# Patient Record
Sex: FEMALE | Race: WHITE | URBAN - METROPOLITAN AREA
[De-identification: names, ages, dates, MRNs, and addresses within clinical notes are randomized per-mention and may not be internally consistent; named-entity substitution may affect disease eponyms.]

---

## 2019-04-10 ENCOUNTER — OFFICE VISIT (OUTPATIENT)
Dept: INTERNAL MEDICINE CLINIC | Age: 49
End: 2019-04-10

## 2019-04-10 VITALS
RESPIRATION RATE: 18 BRPM | SYSTOLIC BLOOD PRESSURE: 99 MMHG | WEIGHT: 143 LBS | HEART RATE: 65 BPM | BODY MASS INDEX: 24.41 KG/M2 | TEMPERATURE: 98 F | OXYGEN SATURATION: 98 % | DIASTOLIC BLOOD PRESSURE: 67 MMHG | HEIGHT: 64 IN

## 2019-04-10 DIAGNOSIS — E88.1 LIPOATROPHY: ICD-10-CM

## 2019-04-10 DIAGNOSIS — Z00.00 ENCOUNTER FOR PREVENTIVE HEALTH EXAMINATION: ICD-10-CM

## 2019-04-10 DIAGNOSIS — J01.10 ACUTE NON-RECURRENT FRONTAL SINUSITIS: ICD-10-CM

## 2019-04-10 DIAGNOSIS — G43.009 MIGRAINE WITHOUT AURA AND WITHOUT STATUS MIGRAINOSUS, NOT INTRACTABLE: ICD-10-CM

## 2019-04-10 DIAGNOSIS — G35 MULTIPLE SCLEROSIS (HCC): Primary | ICD-10-CM

## 2019-04-10 PROBLEM — N20.0 CALCULUS OF KIDNEY: Status: ACTIVE | Noted: 2019-04-10

## 2019-04-10 RX ORDER — PERPHENAZINE 2 MG
1 TABLET ORAL DAILY
Qty: 90 CAP | Refills: 3 | Status: SHIPPED | OUTPATIENT
Start: 2019-04-10

## 2019-04-10 RX ORDER — ACETAMINOPHEN 500 MG
TABLET ORAL
Qty: 30 CAP | Refills: 11 | Status: SHIPPED | OUTPATIENT
Start: 2019-04-10

## 2019-04-10 RX ORDER — AZITHROMYCIN 250 MG/1
TABLET, FILM COATED ORAL
Qty: 6 TAB | Refills: 0 | Status: SHIPPED | OUTPATIENT
Start: 2019-04-10 | End: 2019-04-10 | Stop reason: SDUPTHER

## 2019-04-10 RX ORDER — AZITHROMYCIN 250 MG/1
TABLET, FILM COATED ORAL
Qty: 6 TAB | Refills: 0 | Status: SHIPPED | OUTPATIENT
Start: 2019-04-10 | End: 2019-04-15

## 2019-04-10 NOTE — PATIENT INSTRUCTIONS
Sinusitis>>Zpak, Saline Nasal Spray, Vitamin C 
 
MS>>stable, check Vitamin D, Ocrevus Screening Services: 
 
Colonoscopy:       Dr. Faheem Ragland:                    Up to day Immunizations:   Consider flu vaccine

## 2019-04-10 NOTE — PROGRESS NOTES
Chief Complaint Patient presents with  Complete Physical  
1. Have you been to the ER, urgent care clinic since your last visit? Hospitalized since your last visit? No 
 
2. Have you seen or consulted any other health care providers outside of the [address] [place] [address] since your last visit? Include any pap smears or colon screening. No  
Labs ordered by provider Blood drawn from left arm Pt tolerated well

## 2019-04-10 NOTE — PROGRESS NOTES
HPI:  
 
This guillermo lady presents to the office for her annual wellness visit and her records from [facility] have not been forwarded although was requested weeks back. I queried [address] [place] [address] for information which was populated into the EMR. She is receiving Ocrevus every 6 months as a 5 hour infusion for the treatment of her relapsing remitting MS under the direction of HARLEEN Sheffield. She is tolerating this well and her most recent MRI brain did not show any new lesions. She denies any problems with cramps, paresthesias, blurring of vision ,weakness or cognitive impairment. She reports recent development of cough sometime after finishing her Ocrevus infusion which has kept her awake at night. She had noticed blood stained nasal drainage but denies any fever, chills or sore throat. She also reports taking Unisom because of difficulty getting to sleep. Her  snores heavily and even with the Unisom, she finds herself awake at 3AM with her mind spinning. She would like to avoid any prescription medication. She has documented urolithiasis which has been quiescent. She denies any flank pain or hematuria. Past Medical History:  
Diagnosis Date  Calculus of kidney 4/10/2019  Migraine without aura and without status migrainosus, not intractable 4/10/2019  Multiple sclerosis ([facility] [place] [address] 4/10/2019 Past Surgical History:  
Procedure Laterality Date  HX BREAST REDUCTION    
 HX  SECTION Current Outpatient Medications Medication Sig  ocrelizumab (OCREVUS) 30 mg/mL soln injection 1 mL by IntraVENous route every 6 months. Indications: relapsing form of multiple sclerosis No current facility-administered medications for this visit. Allergies and Intolerances: Allergies Allergen Reactions  Copaxone [Glatiramer] Other (comments) Flushing, headache, palpitations  Minocycline Other (comments) Light headed Family History:  
Family History Problem Relation Age of Onset  Osteoporosis Mother  Glaucoma Mother  Heart Disease Father  Heart Disease Maternal Grandmother Social History: She  reports that she has never smoked. She has never used smokeless tobacco.  
Social History Substance and Sexual Activity Alcohol Use Yes Comment: social  
    
Immunization History:        Avoid flu Diet:                                   Regular Exercise:                           None Review of Systems Constitutional: Negative for chills and fever. HENT: Negative for hearing loss. Eyes: Negative for blurred vision. Respiratory: Positive for cough. Negative for shortness of breath and wheezing. Cardiovascular: Negative for chest pain, palpitations and leg swelling. Gastrointestinal: Negative for abdominal pain, blood in stool and heartburn. Genitourinary: Negative for dysuria, hematuria and urgency. Musculoskeletal: Negative for joint pain and myalgias. Neurological: Negative for dizziness and headaches. Endo/Heme/Allergies: Positive for environmental allergies. Psychiatric/Behavioral: Negative for depression. The patient is nervous/anxious and has insomnia. Physical:  
Visit Vitals BP 99/67 Pulse 65 Temp 98 °F (36.7 °C) (Oral) Resp 18 Ht 5' 4\" (1.626 m) Wt 143 lb (64.9 kg) SpO2 98% BMI 24.55 kg/m² Physical Exam  
Constitutional: She is well-developed, well-nourished, and in no distress. HENT:  
Right Ear: External ear normal.  
Left Ear: External ear normal.  
Mouth/Throat: Oropharynx is clear and moist. No oropharyngeal exudate. Maxillary sinus tenderness Eyes: Conjunctivae are normal. No scleral icterus. Neck: No JVD present. No thyromegaly present. Cardiovascular: Normal rate, regular rhythm, normal heart sounds and intact distal pulses. Exam reveals no gallop. No murmur heard. Pulmonary/Chest: Breath sounds normal. She has no wheezes. She has no rales. Abdominal: Soft. Bowel sounds are normal. She exhibits no distension. There is no tenderness. Musculoskeletal: She exhibits no edema or tenderness. Lymphadenopathy:  
  She has no cervical adenopathy. Neurological: She is alert. She has normal strength and intact cranial nerves. She displays no tremor. Gait normal.  
Skin: She is not diaphoretic. Vitals reviewed. Review of Data: 
Labs: 
No results found for any previous visit. Impression/Plan: 
 Patient Active Problem List  
Diagnosis  Code Screening/Wellness Fasting labs ordered, confirm date of last colonoscopy and DEXA, immunizations discussed (avoids) Z0.00  Multiple sclerosis ([facility] [address] relapsing, remitting type Management per NP , check Vitamin D levle G35  Migraine without aura and without status migrainosus, not intractable Determine if abortive agent prescribed G43.009  Calculus of kidney Push acidulated fluids N20.0 Darci Aleman MD

## 2019-04-11 LAB
25(OH)D3+25(OH)D2 SERPL-MCNC: 59.2 NG/ML (ref 30–100)
ALBUMIN SERPL-MCNC: 4.4 G/DL (ref 3.5–5.5)
ALBUMIN/GLOB SERPL: 1.6 {RATIO} (ref 1.2–2.2)
ALP SERPL-CCNC: 67 IU/L (ref 39–117)
ALT SERPL-CCNC: 10 IU/L (ref 0–32)
APPEARANCE UR: CLEAR
AST SERPL-CCNC: 13 IU/L (ref 0–40)
BACTERIA #/AREA URNS HPF: NORMAL /[HPF]
BILIRUB SERPL-MCNC: 0.5 MG/DL (ref 0–1.2)
BILIRUB UR QL STRIP: NEGATIVE
BUN SERPL-MCNC: 16 MG/DL (ref 6–24)
BUN/CREAT SERPL: 25 (ref 9–23)
CALCIUM SERPL-MCNC: 9.8 MG/DL (ref 8.7–10.2)
CASTS URNS QL MICRO: NORMAL /LPF
CHLORIDE SERPL-SCNC: 105 MMOL/L (ref 96–106)
CHOLEST SERPL-MCNC: 206 MG/DL (ref 100–199)
CO2 SERPL-SCNC: 26 MMOL/L (ref 20–29)
COLOR UR: YELLOW
CREAT SERPL-MCNC: 0.65 MG/DL (ref 0.57–1)
EPI CELLS #/AREA URNS HPF: NORMAL /HPF (ref 0–10)
ERYTHROCYTE [DISTWIDTH] IN BLOOD BY AUTOMATED COUNT: 13.6 % (ref 12.3–15.4)
GLOBULIN SER CALC-MCNC: 2.8 G/DL (ref 1.5–4.5)
GLUCOSE SERPL-MCNC: 73 MG/DL (ref 65–99)
GLUCOSE UR QL: NEGATIVE
HCT VFR BLD AUTO: 42.2 % (ref 34–46.6)
HDLC SERPL-MCNC: 83 MG/DL
HGB BLD-MCNC: 13.6 G/DL (ref 11.1–15.9)
HGB UR QL STRIP: NEGATIVE
KETONES UR QL STRIP: NEGATIVE
LDLC SERPL CALC-MCNC: 101 MG/DL (ref 0–99)
LEUKOCYTE ESTERASE UR QL STRIP: NEGATIVE
MCH RBC QN AUTO: 30 PG (ref 26.6–33)
MCHC RBC AUTO-ENTMCNC: 32.2 G/DL (ref 31.5–35.7)
MCV RBC AUTO: 93 FL (ref 79–97)
MICRO URNS: NORMAL
MICRO URNS: NORMAL
MUCOUS THREADS URNS QL MICRO: PRESENT
NITRITE UR QL STRIP: NEGATIVE
PH UR STRIP: 7.5 [PH] (ref 5–7.5)
PLATELET # BLD AUTO: 261 X10E3/UL (ref 150–379)
POTASSIUM SERPL-SCNC: 5 MMOL/L (ref 3.5–5.2)
PROT SERPL-MCNC: 7.2 G/DL (ref 6–8.5)
PROT UR QL STRIP: NEGATIVE
RBC # BLD AUTO: 4.54 X10E6/UL (ref 3.77–5.28)
RBC #/AREA URNS HPF: NORMAL /HPF (ref 0–2)
SODIUM SERPL-SCNC: 144 MMOL/L (ref 134–144)
SP GR UR: 1.01 (ref 1–1.03)
TRIGL SERPL-MCNC: 109 MG/DL (ref 0–149)
TSH SERPL DL<=0.005 MIU/L-ACNC: 3.91 UIU/ML (ref 0.45–4.5)
UROBILINOGEN UR STRIP-MCNC: 0.2 MG/DL (ref 0.2–1)
VLDLC SERPL CALC-MCNC: 22 MG/DL (ref 5–40)
WBC # BLD AUTO: 7.3 X10E3/UL (ref 3.4–10.8)
WBC #/AREA URNS HPF: NORMAL /HPF (ref 0–5)

## 2019-04-25 DIAGNOSIS — G44.229 CHRONIC TENSION-TYPE HEADACHE, NOT INTRACTABLE: Primary | ICD-10-CM

## 2019-05-24 ENCOUNTER — TELEPHONE (OUTPATIENT)
Dept: INTERNAL MEDICINE CLINIC | Age: 49
End: 2019-05-24

## 2019-05-24 NOTE — TELEPHONE ENCOUNTER
Pt called stating she took unisom instead of advil this morning. She is concerned and would like to know what to do. Please advise asap.

## 2019-09-20 PROBLEM — Z00.00 ENCOUNTER FOR PREVENTIVE HEALTH EXAMINATION: Status: RESOLVED | Noted: 2019-04-10 | Resolved: 2019-09-20

## 2019-09-25 ENCOUNTER — TELEPHONE (OUTPATIENT)
Dept: FAMILY MEDICINE CLINIC | Age: 49
End: 2019-09-25

## 2019-09-25 NOTE — TELEPHONE ENCOUNTER
PT calling to speak w/ Jaleesa Bio in regards to a medication refill for migraines & she is not sure of the name and thinks Jaleesa Bio would be of better assistance

## 2020-03-23 ENCOUNTER — OFFICE VISIT (OUTPATIENT)
Dept: NEUROLOGY | Age: 50
End: 2020-03-23

## 2020-03-23 VITALS
BODY MASS INDEX: 25.27 KG/M2 | OXYGEN SATURATION: 98 % | WEIGHT: 148 LBS | SYSTOLIC BLOOD PRESSURE: 122 MMHG | HEIGHT: 64 IN | DIASTOLIC BLOOD PRESSURE: 70 MMHG | HEART RATE: 69 BPM

## 2020-03-23 DIAGNOSIS — Z79.899 LONG-TERM CURRENT USE OF HIGH RISK MEDICATION OTHER THAN ANTICOAGULANT: ICD-10-CM

## 2020-03-23 DIAGNOSIS — F51.04 PSYCHOPHYSIOLOGICAL INSOMNIA: ICD-10-CM

## 2020-03-23 DIAGNOSIS — F41.9 ANXIETY: ICD-10-CM

## 2020-03-23 DIAGNOSIS — G35 MULTIPLE SCLEROSIS (HCC): Primary | ICD-10-CM

## 2020-03-23 DIAGNOSIS — Z79.899 HIGH RISK MEDICATION USE: ICD-10-CM

## 2020-03-23 RX ORDER — ESCITALOPRAM OXALATE 5 MG/1
5 TABLET ORAL DAILY
Qty: 30 TAB | Refills: 2 | Status: SHIPPED | OUTPATIENT
Start: 2020-03-23 | End: 2020-09-21

## 2020-03-23 NOTE — PROGRESS NOTES
Ck Lucero is a 47 y.o. female who presents today for the following:  Chief Complaint   Patient presents with    New Patient     just had ocrevus infusion doing well    Multiple Sclerosis         HPI  Historical Data    Patient is known to me from my prior practice and is followed for a diagnosis of MS  She has relapsing remitting MS    Ocrevus: Induction infusion  competed September 8 and 22, 2017  Infusion  #2 completed March 16th, 2018  Infusion #3 Sept 21 2018  Infusion #4 March 25, 2019  Infusion #5 September 20, 2019 [bioscript on [address]]  Infusion #6 March 25, 2020 (by a prescription on [address] [facility] in [place])    Denies [place]   On [facility] [place]: feels Penne Sermons fog\" gone; cognition more clear    Prior DMT  Copaxone 40mg 3x/week  Copaxone 20mg daily    7/10/17. JCV: negative. Hep B: non-reactive. Pt's blood work panel[CBC, CMP, VZV, JCV, Hep B] of 7/10/17 including Hep B was normal/non-reactive. Multiple sclerosis (MS) symptoms are stable. Since last visit, there has been nothing to suggest an MS relapse. .     Denies: B/B, mood, Cognition, fatigue, sleep issues, HAs and Migraines, Numbness and paresthesia, weakness,     11/2018: ER for final dx of Gastroenteritis, near syncope and dehydration  [EMR reviewed OV 4/30/19]   WBC: 15.0  Ketones 20 in Urine  Abd/pelvic CT: inflammatory/ infectious enteritis  D/C home wo further event  Per pt: was being treated for rash w Minocin and per pt PCP states due to a reaction to Abx      6/2017  Copaxone injection reaction [see below]  Stress and Echo: WNL. Pt was seen 6/21/17 due to Copaxone injection reaction. Monday night pt injected as usual.   Had a bit of blood from injection site that happens from time to time per pt report.    But it was a bit more than usual.   She remembers not feeling well and having a burning sensation in her chest and tachycardia [she had had it once before and Dx as IPIR] but this time she felt so poorly that she laid down and she got severe low back pain  abdominal cramping and her face felt hot turned red and began to swell. She never felt like her breathing was impaired or her throat was closing or could not breath. 911 was called. By the time she arrived she was feeling some better and did not go to the hospital.   She did call me, [as she has my personal phone number] and recounted the  above data. She was advised NOT to take the Copaxone again [and she has not]. She was advised to take Benadryl that night [which she did] and follow up during the day with OTC non-sedating histamine down regulator such as Claritin, Zyrtec etc.   She is taking Claritin daily. And she was advised if sx should worsen again or breathing feels compromised to go to the ER. And she was advised to see me in the office this week for evaluation and discussion of change in DMT [and she is here today to do so]. At f/u visit:  she feels worn out and that she was \"hit by a truck\". and still achy all over. She expresses concern about possible cardiac damage. Stress test was odered  pt with significant reaction to Copaxone. I STILL  think it is more of an allergic reaction than simply a IPIR. Pt had facial swelling. She has had a IPIR prior to this when on the 20mg daily injection: that one was similar but not as severe and no facial flushing or swelling and did not take a week to recover. And I would not expect an IPIR to escalate. She hit a superficial vein with this last one so it is possible that may have caused more rapid absorption to magnify event but there is no way to know and I lean toward it not being a major player. There is NO WAY to monitor for superficial vein distribution so in-office monitoring would not be beneficial.         2011: + Lyme titer; worked up by Dr. Gila Todd and felt to be a false + reading. 4/23/18  MRI Head w/wo contrast  Comparision: 5/19/17  Unchanged      5/19/17. MRI Head.    NO new lesions identified. No acute changes. NO enhancing lesions. Repeat MRI Brain and C-spine  1/2016:. No new lesions, no enhancing lesions, no interval change :).       8/26/15. MRI BRain w/wo. no acute changes. no new lesions. 7/8/15. MRI Brain w and wo: .   2 new small foci identified. small pineal cyst: unchanged. no acute process. MRI C-spine w/wo.   multiple disc bulges. C5-6: mild LT and minimal Rt foramina stenosis. multiple meningeal cyst or diverticulum. Subtle T2 intensity RT lateral cervical C2 suspect consistent w mild demyelinating lesion. no acute process. MRI T-spine w/wo. no evidence of demyelinating process: acute or chronic.   scattered multilevel minimal or small root diverticula or perineural root cysts bilaterally. Medical Hx:  uterine ablation    Known allergies: codeine: nausea. heat rash on chest spring 2019; also had it in November 2018 and went on an Abx and had a reaction and went to ER; Heart rate dropped and presyncope: reaction to minocycline       Other current medical issues historically: Migraines    Interim Data:   Regarding MS:  Patient is doing well has no complaints and denies any focal signs or symptoms related to MS dysfunction. Specifically she denies any focal weakness or paresthesias, acute visual changes, hearing loss tinnitus, speech or swallowing problems, focal weakness paresthesias, bowel or bladder dysfunction, cognitive difficulties, chronic fatigue. 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 She tries to keep his \"strong personality\" at the office and to maintain a calm demeanor is people look to her for leadership since she is owner of the [employer].     Additionally they are having in addition put on their house currently, her college son is home for the rest of the semester as most college students are, her daughter is home she is in high school, and her son who is graduated from college has recently been laid off and will be moving home at the end of the month. So additionally she has stressors related to family issues as well. Surprisingly, she is having no problems with headaches or migraines      ROS  Other than what is stated above under HPI there is no new or changing issues related to the following:  Constitutional: No recent weight change, fever,fatigue, sleep difficulties, or loss of appetite. ENT/Mouth:  No hearing loss, ringing in the ears, chronic sinus problem, nose bleeds sore throat, voice change, hoarseness, swollen glands in neck, or difficulties with chewing and swallowing. Cardiovascular:  No chest pain/angina pectoris, palpitations,swelling of feet/ankles/hands, or calf pain while walking. Respiratory: No chronic or frequent coughs, spitting up blood, shortness of breath, asthma, or wheezing. Gastrointestinal: No abdominal pain, heartburn, nausea, vomiting, constipation, frequent diarrhea, rectal bleeding, or blood in stool. Genitourinary: No frequent urination, burning or painful urination, blood in urine, incontinence or dribbling. Musculoskeletal:   No joint pain, stiffness/swelling, weakness of muscles, muscle pain/cramp, or back pain. Integument:   No rash/itching, change in skin color, change in hair/nails, or change in color/size of moles. Neurological:  No dizziness/vertigo, loss of consciousness, numbness/tingling sensation, tremors, weakness in limbs, difficulty with balance, frequent or recurring headaches, memory loss or confusion. Psychiatric:   No nervousness, depression, hallucinations, paranoia or suspiciousness. Endocrine: No excessive thirst or urination, heat or cold intolerance.    Hematologic/Lymphatic: No bleeding/bruising tendency, phlebitis, or past transfusion. EXAMINATION  Visit Vitals  /70   Pulse 69   Ht 5' 4\" (1.626 m)   Wt 148 lb (67.1 kg)   SpO2 98%   BMI 25.40 kg/m²          General appearance: Patient is well-developed and well-nourished in no apparent distress and well groomed. Psych/mental health:  Affect: Appropriate appropriately tearful at times when discussing her stress levels and the factors affecting that. PHQ  3 most recent PHQ Screens 3/23/2020   Little interest or pleasure in doing things Not at all   Feeling down, depressed, irritable, or hopeless Not at all   Total Score PHQ 2 0       HEENT: Normocephalic, without evidence of trauma  Full range of motion, no tenderness to palpation in the head or neck region     Cardiovascular: Extremities warm to touch, no swelling appreciated    Respiratory: No dyspnea on exertion noted, normal effort on casual observation    Musculoskeletal: No evidence of significant bony deformities or spinal curvature    Integumentary:  No obvious bruising, lacerations or discoloaration on casual observation. Neurological Examination:   Mental Status:        MMSE  No flowsheet data found. Formal testing was not completed    there was nothing concerning on general observation and discussion.    Alert oriented and appropriate to general conversation  Normal processing on general observation  Followed conversation and responded seemingly appropriate throughout the office visit  No word finding difficulties noted on casual observation  Able to follow directions without difficulty     Cranial Nerves:    PERRLA,   EOMs intact gaze is conjugate  No nystagmus is appreciated  No AMY  Facial motor and sensory intact bilaterally  Hearing intact to conversation  Voice with normal projection, no evidence of secretion pooling  Palate elevates symmetrically  Shoulder shrug intact bilaterally  No tongue deviation appreciated     Motor:   Normal tone and bulk  Fine dexterity intact bilaterally  No tremor appreciated on today's exam  No abnormal movements appreciated on today's exam    Muscle strength testing:  Right side  Upper extremities  Deltoid 5/5  Bicep 5/5  Tricep 5/5  Hand grasp 5/5    Lower extremity  Hip flexor 5/5  Extensor 5/5  Flexor 5/5  Plantar flexion 5/5  Dorsiflexion 5/5      Left side  Deltoid 5/5  Bicep 5/5  Tricep 5/5  Hand grasp 5/5    Lower extremity  Hip flexor 5/5  Extensor 5/5  Flexor 5/5  Plantar flexion 5/5  Dorsiflexion 5/5    Sensation: Intact to light touch bilaterally    Coordination/Cerebellar:   Romberg negative    Gait: Ambulates independently with normal gait and station    Fall risk assessment  No flowsheet data found. Reflexes: Not tested    ASSESSMENT AND PLAN  Relapsing remitting MS currently on Ocrevus therapy infusion #6 completed March 25, 2020. No complications were reported related to this infusion by the patient or the facility. Patient denies any side effects or problems that she is aware of. She does not report any signs or symptoms related to MS flareup or progression of disease process. She recently had blood work done by her primary care office on March 23, 2020 that was faxed to our practice and was reviewed at today's office visit. Vitamin D is at an excellent level for MS at 62.5 my goal is to keep them above 40. Thyroid chemistry and hematology panel were all unremarkable however differential was not performed urinalysis was unremarkable    My only recommendation would be a next blood draw that we get a differential with a CBC. Presently no neuro imaging will be ordered given the issue of the pandemic. Normally she would be due spring 2019 for her annual screening MRIs but this will have to be deferred presently. I will defer JCV blood draw at this time for the same reason I think the risk of any additional possible exposure is greater than the risk of not getting the JCV titer in this particular patient.     We reviewed [address] and and [place]MSS guidelines regarding MS patients and COVID 19 related to the disease process and drug modifying therapy. For This patient no adjustments are necessary and she is to follow CDC guidelines regarding social distance and self quarantine as appropriate. And she can always contact me if she has any questions or concerns    Stress and anxiety: Appropriate given the current state of affairs and her concerns related to running a business. We talked about behavioral stress management techniques and we are going to add Lexapro 5 mg daily. We might need to increase that to 10 mg. Insomnia: Related to stress and anxiety interventions as stated above. We also talked about ways to improve sleep hygiene especially in these times of stress and anxiety. Finally we have talked about ways to help keep her employees safe which is a heavy burden on her. Effectively we discussed CDC guidelines and ways to better implement this in her work environment which she states she is already doing with the guidance of her HR department. But it is still concerning to her. I have directed her to perhaps have her HR person reach out to [employer] further guidance as well. I will see her back in 6 months. If she needs to be seen before then she is to contact the office via my chart or phone call and we can set up a telehealth visit. ICD-10-CM ICD-9-CM    1. Multiple sclerosis ([address] G35 340    2. Anxiety F41.9 300.00 escitalopram oxalate (LEXAPRO) 5 mg tablet   3. High risk medication use Z79.899 V58.69    4. Long-term current use of high risk medication other than anticoagulant Z79.899 V58.69    5. Psychophysiological insomnia F51.04 307.42            Additional pertinent medical data reviewed at today's office visit:    Flowsheets    Allergies   Allergen Reactions    Flaxseed Anaphylaxis    Glatiramer (Copolymer 1) Swelling     Rapid heartbeat.  Burning in chest.    Codeine Nausea and Vomiting     NAUSEA    Copaxone [Glatiramer] Other (comments)     Flushing, headache, palpitations    Minocycline Other (comments)     Light headed        Current Outpatient Medications   Medication Sig    escitalopram oxalate (LEXAPRO) 5 mg tablet Take 1 Tab by mouth daily.  ocrelizumab (OCREVUS) 30 mg/mL soln injection 1 mL by IntraVENous route every 6 months. Indications: relapsing form of multiple sclerosis    cholecalciferol (VITAMIN D3) 2,000 unit cap capsule 5000 units by mouth once a day  Indications: Prevention of Vitamin D Deficiency    krill-om-3-dha-epa-phospho-ast (KRILL OIL) 1,409-043-46-80 mg cap Take 1 Cap by mouth daily. No current facility-administered medications for this visit. Past Medical History:   Diagnosis Date    Calculus of kidney 4420    Folliculitis     Lipoatrophy     Copaxone    Migraine without aura and without status migrainosus, not intractable 4/10/2019    Multiple sclerosis ([facility] [address] 4/10/2019    Optic neuritis     Subclinical hypothyroidism        Past Surgical History:   Procedure Laterality Date    HX BREAST REDUCTION      HX  SECTION      HX HYSTEROSCOPY WITH ENDOMETRIAL ABLATION      HX LITHOTRIPSY         Social History     Socioeconomic History    Marital status:      Spouse name: Not on file    Number of children: Not on file    Years of education: Not on file    Highest education level: Not on file   Tobacco Use    Smoking status: Never Smoker    Smokeless tobacco: Never Used   Substance and Sexual Activity    Alcohol use: Yes     Comment: social    Drug use: Never    Sexual activity: Yes     Partners: Male       Family History   Problem Relation Age of Onset    Osteoporosis Mother     Glaucoma Mother     Heart Disease Father     Heart Disease Maternal Grandmother           No visits with results within 3 Month(s) from this visit.    Latest known visit with results is:   Office Visit on 04/10/2019   Component Date Value    Glucose 04/10/2019 73     BUN 04/10/2019 16     Creatinine 04/10/2019 0.65     GFR est non-AA 04/10/2019 105     GFR est AA 04/10/2019 121     BUN/Creatinine ratio 04/10/2019 25*    Sodium 04/10/2019 144     Potassium 04/10/2019 5.0     Chloride 04/10/2019 105     CO2 04/10/2019 26     Calcium 04/10/2019 9.8     Protein, total 04/10/2019 7.2     Albumin 04/10/2019 4.4     GLOBULIN, TOTAL 04/10/2019 2.8     A-G Ratio 04/10/2019 1.6     Bilirubin, total 04/10/2019 0.5     Alk. phosphatase 04/10/2019 67     AST (SGOT) 04/10/2019 13     ALT (SGPT) 04/10/2019 10     Specific Gravity 04/10/2019 1.014     pH (UA) 04/10/2019 7.5     Color 04/10/2019 Yellow     Appearance 04/10/2019 Clear     Leukocyte Esterase 04/10/2019 Negative     Protein 04/10/2019 Negative     Glucose 04/10/2019 Negative     Ketone 04/10/2019 Negative     Blood 04/10/2019 Negative     Bilirubin 04/10/2019 Negative     Urobilinogen 04/10/2019 0.2     Nitrites 04/10/2019 Negative     Microscopic Examination 04/10/2019 Comment     Microscopic exam 04/10/2019 See additional order     WBC 04/10/2019 0-5     RBC 04/10/2019 0-2     Epithelial cells 04/10/2019 0-10     Casts 04/10/2019 None seen     Mucus 04/10/2019 Present     Bacteria 04/10/2019 Few     WBC 04/10/2019 7.3     RBC 04/10/2019 4.54     HGB 04/10/2019 13.6     HCT 04/10/2019 42.2     MCV 04/10/2019 93     MCH 04/10/2019 30.0     MCHC 04/10/2019 32.2     RDW 04/10/2019 13.6     PLATELET 83/00/9967 638     Cholesterol, total 04/10/2019 206*    Triglyceride 04/10/2019 109     HDL Cholesterol 04/10/2019 83     VLDL, calculated 04/10/2019 22     LDL, calculated 04/10/2019 101*    VITAMIN D, 25-HYDROXY 04/10/2019 59.2     TSH 04/10/2019 3.910        XR Results (maximum last 3): No results found for this or any previous visit. CT Results (maximum last 3): No results found for this or any previous visit. MRI Results (maximum last 3):   No results found for this or any previous visit. Follow-up and Dispositions    · Return in about 6 months (around 9/23/2020).            Hilda Hamilton, MS, ANP-BC, [facility]

## 2020-03-23 NOTE — PATIENT INSTRUCTIONS
Office Policies · Phone calls/patient messages: Please allow up to 24 hours for someone in the office to contact you about your call or message. Be mindful your provider may be out of the office or your message may require further review. We encourage you to use [employer] for your messages as this is a faster, more efficient way to communicate with our office · Medication Refills: 
Prescription medications require up to 48 business hours to process. We encourage you to use [employer] for your refills. For controlled medications: Please allow up to 72 business hours to process. Certain medications may require you to  a written prescription at our office. NO narcotic/controlled medications will be prescribed after 4pm Monday through Friday or on weekends · Form/Paperwork Completion: 
Please note there is a $25 fee for all paperwork completed by our providers. We ask that you allow 7-14 business days. Pre-payment is due prior to picking up/faxing the completed form. You may also download your forms to [employer] to have your doctor print off. From an MS perspective you are doing great I will continue your Ocrevus infusions as scheduled at this point in time. We will see you back after you next Ocrevus infusion in 6 months. Per the recommendations of [address] [place] [address] Drive you are at no greater risk at this point in time to contract coronavirus so continued just based on CDC recommendations regarding safety management and distancing\" self quarantine You have got plenty of issues related to stress we talked about stress management technique especially your wind down at night please continue to try to institute those. On that is just start you out on low-dose Lexapro to help take the edge off a little bit to help you to relax a little bit more.   It is suboptimal dosing for adult dosing at just 5 mg daily give it about 6 weeks to kick in if you do not feel like this is helpful to you it may be due to the fact that the dosing is too low and we should probably increase it to 10 mg. 
 
I do not expect you to be on this medication lifelong but we may need to be on it for at least 3 to 6 months to get you through the current crisis. We are going to be available via teleconference until a visiting hopefully by next week at the latest.  So if you need to see me before 6 months we can set that up just contact the office for an appointment. Continue to follow the CDC recommendations and guidelines. This may be changing on a daily or hourly basis. This can be found on the web at:  [employer] Basic standard of care includes the following: 
 
Wash hands frequently using soap and water or hand  with at least 60% isopropyl alcohol content Social distancing of at least 6 feet If you think you have been exposed please self quarantine for 14 days as symptoms will begin anywhere between 2 to 14 days post exposure It is possible you could have been exposed to someone who is asymptomatic or not showing signs and symptoms at any point in time. Subsequently if you start developing any type of respiratory symptoms please self quarantine for up to 14 days. If you are showing signs or symptoms of any type of cough, cold, flu or even allergies please self quarantine for up to 14 days Only engage in essential functions only. This would include going out for food and living essentials. Please do not magda. The supply chain has not been broken or cut off you will be able to go out again and get more products as you use them. Avoid social gatherings of any size unless deemed essential.  This includes unfortunately, going to [employer] or visiting with family or friends. It is reasonable to go outside and walk your dog. But is recommended to stay close to your residence. It is still considered reasonable to go outside to exercise.   But again solo if possible especially if running or cycling. It is still considered reasonable to go outside and play with your children but again close to your personal residence and without social gathering. Do not linger in restaurants or bars. As of now drive-through and  are the recommendations. It is best to \"sit and stay\" meaning stay where you are and do not go out unless essential and do not travel unless essential 
 
Recommendations will continue to change over time so please stay up-to-date with the latest CDC recommendations site is listed above

## 2020-04-29 ENCOUNTER — TELEPHONE (OUTPATIENT)
Dept: NEUROLOGY | Age: 50
End: 2020-04-29

## 2020-04-29 NOTE — TELEPHONE ENCOUNTER
----- Message from Oneyda Campbell NP sent at 4/29/2020 10:07 AM EDT -----  Regarding: ocrevus  We need to contact [employer] their phone number is [phone] and get the above-stated patient\"s care transferred to me up here at [facility] as the patient followed me from my prior practice and they still all have all that data on file they do not have me at the new practice on file find out what we need to do to get her transferred over to this practice please and thank you

## 2020-04-29 NOTE — TELEPHONE ENCOUNTER
Spoke with Jenise of [facility], ID of the patient verified times 2. Jenise made aware the patient has followed Nydia Gresham NP to the neurology clinic in [address] New start form must be filled out with the new address and the patient will need to sign the new form, but Jenise stated [facility] and they can email the patient for consent.

## 2020-04-30 ENCOUNTER — TELEPHONE (OUTPATIENT)
Dept: NEUROLOGY | Age: 50
End: 2020-04-30

## 2020-04-30 NOTE — TELEPHONE ENCOUNTER
Spoke with Jimbo Jenkins at [facility] of the patient verified times 2. Nayely Vera NP information was faxed today to [address] [place] [address] at [phone] and the start form was faxed with [facility] information as well as the patient. They will have the patient signed the consent.

## 2020-09-21 ENCOUNTER — OFFICE VISIT (OUTPATIENT)
Dept: NEUROLOGY | Age: 50
End: 2020-09-21
Payer: COMMERCIAL

## 2020-09-21 VITALS
HEIGHT: 64 IN | BODY MASS INDEX: 25.61 KG/M2 | DIASTOLIC BLOOD PRESSURE: 75 MMHG | TEMPERATURE: 97.7 F | OXYGEN SATURATION: 98 % | HEART RATE: 67 BPM | WEIGHT: 150 LBS | SYSTOLIC BLOOD PRESSURE: 118 MMHG

## 2020-09-21 DIAGNOSIS — Z79.899 HIGH RISK MEDICATION USE: ICD-10-CM

## 2020-09-21 DIAGNOSIS — Z79.899 LONG-TERM CURRENT USE OF HIGH RISK MEDICATION OTHER THAN ANTICOAGULANT: ICD-10-CM

## 2020-09-21 DIAGNOSIS — F41.9 ANXIETY: ICD-10-CM

## 2020-09-21 DIAGNOSIS — G43.009 MIGRAINE WITHOUT AURA AND WITHOUT STATUS MIGRAINOSUS, NOT INTRACTABLE: ICD-10-CM

## 2020-09-21 DIAGNOSIS — E55.9 VITAMIN D DEFICIENCY: ICD-10-CM

## 2020-09-21 DIAGNOSIS — G35 MULTIPLE SCLEROSIS (HCC): Primary | ICD-10-CM

## 2020-09-21 DIAGNOSIS — F51.04 PSYCHOPHYSIOLOGICAL INSOMNIA: ICD-10-CM

## 2020-09-21 PROCEDURE — 99215 OFFICE O/P EST HI 40 MIN: CPT | Performed by: PSYCHIATRY & NEUROLOGY

## 2020-09-21 RX ORDER — EPINEPHRINE 0.3 MG/.3ML
0.3 INJECTION SUBCUTANEOUS AS NEEDED
COMMUNITY
Start: 2020-05-04

## 2020-09-21 RX ORDER — BUTALBITAL, ASPIRIN, AND CAFFEINE 325; 50; 40 MG/1; MG/1; MG/1
1 CAPSULE ORAL
COMMUNITY

## 2020-09-21 NOTE — PROGRESS NOTES
Moriah Hicks is a 45 y.o. female who presents today for the following:  Chief Complaint   Patient presents with    Follow-up     did not start anti anxiety prescibed    Multiple Sclerosis         HPI  Historical Data    Patient is known to me from my prior practice and is followed for a diagnosis of MS  She has relapsing remitting MS    Ocrevus: Induction infusion  competed September 8 and 22, 2017  Infusion  #2 completed March 16th, 2018  Infusion #3 Sept 21 2018  Infusion #4 March 25, 2019  Infusion #5 September 20, 2019 [bioscript on [address]]  Infusion #6 March 25, 2020 (by a prescription on [address] [facility]  [facility] [place]  Infusion #8 Pending for March 19 2021    Denies SE   On Pat Chávez: feels Sherral Brittle fog\" gone; cognition more clear    Prior DMT  Copaxone 40mg 3x/week  Copaxone 20mg daily    7/10/17. JCV: negative. Hep B: non-reactive. Pt's blood work panel[CBC, CMP, VZV, JCV, Hep B] of 7/10/17 including Hep B was normal/non-reactive. Multiple sclerosis (MS) symptoms are stable. Since last visit, there has been nothing to suggest an MS relapse. No evidence of infeciton    Denies: B/B, mood, Cognition, fatigue, sleep issues, HAs and Migraines, Numbness and paresthesia, weakness,     11/2018: ER for final dx of Gastroenteritis, near syncope and dehydration  [EMR reviewed OV 4/30/19]   WBC: 15.0  Ketones 20 in Urine  Abd/pelvic CT: inflammatory/ infectious enteritis  D/C home wo further event  Per pt: was being treated for rash w Minocin and per pt PCP states due to a reaction to Abx      6/2017  Copaxone injection reaction [see below]  Stress and Echo: WNL. Pt was seen 6/21/17 due to Copaxone injection reaction. Monday night pt injected as usual.   Had a bit of blood from injection site that happens from time to time per pt report.    But it was a bit more than usual.   She remembers not feeling well and having a burning sensation in her chest and tachycardia [she had had it once before and Dx as IPIR] but this time she felt so poorly that she laid down and she got severe low back pain  abdominal cramping and her face felt hot turned red and began to swell. She never felt like her breathing was impaired or her throat was closing or could not breath. 911 was called. By the time she arrived she was feeling some better and did not go to the hospital.   She did call me, [as she has my personal phone number] and recounted the  above data. She was advised NOT to take the Copaxone again [and she has not]. She was advised to take Benadryl that night [which she did] and follow up during the day with OTC non-sedating histamine down regulator such as Claritin, Zyrtec etc.   She is taking Claritin daily. And she was advised if sx should worsen again or breathing feels compromised to go to the ER. And she was advised to see me in the office this week for evaluation and discussion of change in DMT [and she is here today to do so]. At f/u visit:  she feels worn out and that she was \"hit by a truck\". and still achy all over. She expresses concern about possible cardiac damage. Stress test was odered  pt with significant reaction to Copaxone. I STILL  think it is more of an allergic reaction than simply a IPIR. Pt had facial swelling. She has had a IPIR prior to this when on the 20mg daily injection: that one was similar but not as severe and no facial flushing or swelling and did not take a week to recover. And I would not expect an IPIR to escalate. She hit a superficial vein with this last one so it is possible that may have caused more rapid absorption to magnify event but there is no way to know and I lean toward it not being a major player. There is NO WAY to monitor for superficial vein distribution so in-office monitoring would not be beneficial.         2011: + Lyme titer; worked up by Dr. Trenton Hanley and felt to be a false + reading.     4/23/18  MRI Head w/wo contrast  Comparision: 5/19/17  Unchanged      5/19/17. MRI Head. NO new lesions identified. No acute changes. NO enhancing lesions. Repeat MRI Brain and C-spine  1/2016:. No new lesions, no enhancing lesions, no interval change :).       8/26/15. MRI BRain w/wo. no acute changes. no new lesions. 7/8/15. MRI Brain w and wo: .   2 new small foci identified. small pineal cyst: unchanged. no acute process. MRI C-spine w/wo.   multiple disc bulges. C5-6: mild LT and minimal Rt foramina stenosis. multiple meningeal cyst or diverticulum. Subtle T2 intensity RT lateral cervical C2 suspect consistent w mild demyelinating lesion. no acute process. MRI T-spine w/wo. no evidence of demyelinating process: acute or chronic.   scattered multilevel minimal or small root diverticula or perineural root cysts bilaterally. Medical Hx:  uterine ablation    Known allergies: codeine: nausea. heat rash on chest spring 2019; also had it in November 2018 and went on an Abx and had a reaction and went to ER; Heart rate dropped and presyncope: reaction to minocycline       Other current medical issues historically: Migraines    Interim Data:   Regarding MS:  Patient is doing well has no complaints and denies any focal signs or symptoms related to MS dysfunction. Specifically she denies any focal weakness or paresthesias, acute visual changes, hearing loss tinnitus, speech or swallowing problems, focal weakness paresthesias, bowel or bladder dysfunction, cognitive difficulties, chronic fatigue.     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 but always present  She is now sleeping better at night and she is not as an emotional  She never started the Lexapro and seems to be doing okay    Headaches and migraines  Infrequent usually uses Fiorinal less than once a month to abort a headache    Additional pertinent data  May 4, 2021 to [facility] ED for anaphylactic reaction to flaxseed   Treated with Benadryl EpiPen and steroids   Was not admitted to the hospital and no further sequela   Discharged home on steroids and EpiPen    ROS  Other than what is stated above under HPI there is no new or changing issues related to the following:  Constitutional: No recent weight change, fever,fatigue, sleep difficulties, or loss of appetite. ENT/Mouth:  No hearing loss, ringing in the ears, chronic sinus problem, nose bleeds sore throat, voice change, hoarseness, swollen glands in neck, or difficulties with chewing and swallowing. Cardiovascular:  No chest pain/angina pectoris, palpitations,swelling of feet/ankles/hands, or calf pain while walking. Respiratory: No chronic or frequent coughs, spitting up blood, shortness of breath, asthma, or wheezing. Gastrointestinal: No abdominal pain, heartburn, nausea, vomiting, constipation, frequent diarrhea, rectal bleeding, or blood in stool. Genitourinary: No frequent urination, burning or painful urination, blood in urine, incontinence or dribbling. Musculoskeletal:   No joint pain, stiffness/swelling, weakness of muscles, muscle pain/cramp, or back pain. Integument:   No rash/itching, change in skin color, change in hair/nails, or change in color/size of moles. Neurological:  No dizziness/vertigo, loss of consciousness, numbness/tingling sensation, tremors, weakness in limbs, difficulty with balance, frequent or recurring headaches, memory loss or confusion. Psychiatric:   No nervousness, depression, hallucinations, paranoia or suspiciousness. Endocrine: No excessive thirst or urination, heat or cold intolerance. Hematologic/Lymphatic: No bleeding/bruising tendency, phlebitis, or past transfusion.        EXAMINATION  Visit Vitals  /75   Pulse 67   Temp 97.7 °F (36.5 °C)   Ht 5' 4\" (1.626 m)   Wt 150 lb (68 kg)   SpO2 98%   BMI 25.75 kg/m²          General appearance: Patient is well-developed and well-nourished in no apparent distress and well groomed. Psych/mental health:  Affect: Appropriate appropriately tearful at times when discussing her stress levels and the factors affecting that. PHQ  3 most recent PHQ Screens 9/21/2020   Little interest or pleasure in doing things Not at all   Feeling down, depressed, irritable, or hopeless Not at all   Total Score PHQ 2 0       HEENT: Normocephalic, without evidence of trauma  Full range of motion, no tenderness to palpation in the head or neck region     Cardiovascular: Extremities warm to touch, no swelling appreciated    Respiratory: No dyspnea on exertion noted, normal effort on casual observation    Musculoskeletal: No evidence of significant bony deformities or spinal curvature    Integumentary:  No obvious bruising, lacerations or discoloaration on casual observation. Neurological Examination:   Mental Status:        MMSE  No flowsheet data found. Formal testing was not completed    there was nothing concerning on general observation and discussion.    Alert oriented and appropriate to general conversation  Normal processing on general observation  Followed conversation and responded seemingly appropriate throughout the office visit  No word finding difficulties noted on casual observation  Able to follow directions without difficulty     Cranial Nerves:    PERRLA,   EOMs intact gaze is conjugate  No nystagmus is appreciated  No AMY  Facial motor and sensory intact bilaterally  Hearing intact to conversation  Voice with normal projection, no evidence of secretion pooling  Palate elevates symmetrically  Shoulder shrug intact bilaterally  No tongue deviation appreciated     Motor:   Normal tone and bulk  Fine dexterity intact bilaterally  No tremor appreciated on today's exam  No abnormal movements appreciated on today's exam    Muscle strength testing:  Right side  Upper extremities  Deltoid 5/5  Bicep 5/5  Tricep 5/5  Hand grasp 5/5    Lower extremity  Hip flexor 5/5  Extensor 5/5  Flexor 5/5  Plantar flexion 5/5  Dorsiflexion 5/5      Left side  Deltoid 5/5  Bicep 5/5  Tricep 5/5  Hand grasp 5/5    Lower extremity  Hip flexor 5/5  Extensor 5/5  Flexor 5/5  Plantar flexion 5/5  Dorsiflexion 5/5    Sensation: Intact to light touch bilaterally    Coordination/Cerebellar:   Romberg negative    Gait: Ambulates independently with normal gait and station    Fall risk assessment  No flowsheet data found. Reflexes: Not tested    ASSESSMENT AND PLAN  Relapsing remitting MS currently on Ocrevus therapy infusion #7 completed September , 2020. No complications were reported related to this infusion by the patient or the facility. Patient denies any side effects or problems that she is aware of. She does not report any signs or symptoms related to MS flareup or progression of disease process        Presently no neuro imaging will be ordered given the issue of the pandemic. Normally she would be due spring 2019 for her annual screening MRIs but this will have to be deferred presently. I will defer JCV blood draw at this time for the same reason I think the risk of any additional possible exposure is greater than the risk of not getting the JCV titer in this particular patient. Because the patient is doing well we have continued to defer her routine neuroimaging and blood work to include JVD CV panel at this time    Stress and anxiety:   Improved and seems to be managing it behaviorally    Insomnia: Related to stress and anxiety interventions as stated above. We again reviewed  ways to improve sleep hygiene especially in these times of stress and anxiety.     She has been reminded to continue hypervigilance related COVID 19 due to her present disease modifying therapy    Vitamin D deficiency: Remains on vitamin D therapy levels are greater than 40 which is excellent I want her to remain on vitamin D supplements presently to keep this from drifting downward    I will see her back in 6 months. If she needs to be seen before then she is to contact the office via my chart or phone call and we can set up a telehealth visit. ICD-10-CM ICD-9-CM    1. Multiple sclerosis ([facility] [address]  G35 340    2. High risk medication use  Z79.899 V58.69    3. Long-term current use of high risk medication other than anticoagulant  Z79.899 V58.69    4. Psychophysiological insomnia  F51.04 307.42    5. Anxiety  F41.9 300.00    6. Migraine without aura and without status migrainosus, not intractable  G43.009 346.10    7. Vitamin D deficiency  E55.9 268.9            Additional pertinent medical data reviewed at today's office visit:    Flowsheets    Allergies   Allergen Reactions    Flaxseed Anaphylaxis    Glatiramer (Copolymer 1) Swelling     Rapid heartbeat. Burning in chest.    Codeine Nausea and Vomiting     NAUSEA    Copaxone [Glatiramer] Other (comments)     Flushing, headache, palpitations    Minocycline Other (comments)     Light headed        Current Outpatient Medications   Medication Sig    EPINEPHrine (EPIPEN) 0.3 mg/0.3 mL injection 0.3 mg by IntraMUSCular route as needed.  butalbital-aspirin-caffeine (FIORINAL) capsule Take 1 Tab by mouth every four (4) hours as needed.  ocrelizumab (OCREVUS) 30 mg/mL soln injection 1 mL by IntraVENous route every 6 months. Indications: relapsing form of multiple sclerosis    cholecalciferol (VITAMIN D3) 2,000 unit cap capsule 5000 units by mouth once a day  Indications: Prevention of Vitamin D Deficiency    krill-om-3-dha-epa-phospho-ast (KRILL OIL) 1,580-357-66-80 mg cap Take 1 Cap by mouth daily.  multivit-mins no.63/iron/folic (M-VIT PO) Take  by mouth daily. No current facility-administered medications for this visit.         Past Medical History:   Diagnosis Date    Calculus of kidney 2/68/7120    Folliculitis     Lipoatrophy     Copaxone    Migraine without aura and without status migrainosus, not intractable 4/10/2019    Multiple sclerosis ([facility] [address] 4/10/2019    Optic neuritis     Subclinical hypothyroidism        Past Surgical History:   Procedure Laterality Date    HX BREAST REDUCTION      HX  SECTION      HX HYSTEROSCOPY WITH ENDOMETRIAL ABLATION      HX LITHOTRIPSY         Social History     Socioeconomic History    Marital status:      Spouse name: Not on file    Number of children: Not on file    Years of education: Not on file    Highest education level: Not on file   Tobacco Use    Smoking status: Never Smoker    Smokeless tobacco: Never Used   Substance and Sexual Activity    Alcohol use: Yes     Comment: social    Drug use: Never    Sexual activity: Yes     Partners: Male       Family History   Problem Relation Age of Onset    Osteoporosis Mother     Glaucoma Mother     Heart Disease Father     Heart Disease Maternal Grandmother           No visits with results within 3 Month(s) from this visit. Latest known visit with results is:   Office Visit on 04/10/2019   Component Date Value    Glucose 04/10/2019 73     BUN 04/10/2019 16     Creatinine 04/10/2019 0.65     GFR est non-AA 04/10/2019 105     GFR est AA 04/10/2019 121     BUN/Creatinine ratio 04/10/2019 25*    Sodium 04/10/2019 144     Potassium 04/10/2019 5.0     Chloride 04/10/2019 105     CO2 04/10/2019 26     Calcium 04/10/2019 9.8     Protein, total 04/10/2019 7.2     Albumin 04/10/2019 4.4     GLOBULIN, TOTAL 04/10/2019 2.8     A-G Ratio 04/10/2019 1.6     Bilirubin, total 04/10/2019 0.5     Alk.  phosphatase 04/10/2019 67     AST (SGOT) 04/10/2019 13     ALT (SGPT) 04/10/2019 10     Specific Gravity 04/10/2019 1.014     pH (UA) 04/10/2019 7.5     Color 04/10/2019 Yellow     Appearance 04/10/2019 Clear     Leukocyte Esterase 04/10/2019 Negative     Protein 04/10/2019 Negative     Glucose 04/10/2019 Negative     Ketone 04/10/2019 Negative     Blood 04/10/2019 Negative     Bilirubin 04/10/2019 Negative     Urobilinogen 04/10/2019 0.2     Nitrites 04/10/2019 Negative     Microscopic Examination 04/10/2019 Comment     Microscopic exam 04/10/2019 See additional order     WBC 04/10/2019 0-5     RBC 04/10/2019 0-2     Epithelial cells 04/10/2019 0-10     Casts 04/10/2019 None seen     Mucus 04/10/2019 Present     Bacteria 04/10/2019 Few     WBC 04/10/2019 7.3     RBC 04/10/2019 4.54     HGB 04/10/2019 13.6     HCT 04/10/2019 42.2     MCV 04/10/2019 93     MCH 04/10/2019 30.0     MCHC 04/10/2019 32.2     RDW 04/10/2019 13.6     PLATELET 35/07/9263 031     Cholesterol, total 04/10/2019 206*    Triglyceride 04/10/2019 109     HDL Cholesterol 04/10/2019 83     VLDL, calculated 04/10/2019 22     LDL, calculated 04/10/2019 101*    VITAMIN D, 25-HYDROXY 04/10/2019 59.2     TSH 04/10/2019 3.910        XR Results (maximum last 3): No results found for this or any previous visit. CT Results (maximum last 3): No results found for this or any previous visit. MRI Results (maximum last 3): No results found for this or any previous visit. Follow-up and Dispositions    · Return in about 6 months (around 3/21/2021) for In office appointment.            Krystal Ma, MS, ANP-BC, [facility]

## 2020-09-21 NOTE — LETTER
9/21/20 Patient: Lilian Estes YOB: 1970 Date of Visit: 9/21/2020 [place] [address] [address] [facility] [place] [address] VIA Facsimile: [phone] Dear Willam Boothe MD, Thank you for referring Ms. Tonie Gamez to [facility] for evaluation. My notes for this consultation are attached. If you have questions, please do not hesitate to call me. I look forward to following your patient along with you. Sincerely, Leeann Dixon NP

## 2020-09-21 NOTE — PATIENT INSTRUCTIONS
Glad you are doing awesome and the infusion went well you have completed 7 infusions now you started in September 2017 just [facility] Continue to stay hypervigilant regarding COVID 19 restrictions and go to the [facility] website for the most updated guidelines We will see you back in 6 months after your next infusion sooner if there are problems issues or concerns And right now the easiest way to get a hold of us is through my chart to do the telemedicine related to COVID 19 In the meantime have a great holiday season and a happy new year :-)

## 2021-03-22 ENCOUNTER — OFFICE VISIT (OUTPATIENT)
Dept: NEUROLOGY | Age: 51
End: 2021-03-22
Payer: COMMERCIAL

## 2021-03-22 VITALS
HEIGHT: 64 IN | HEART RATE: 67 BPM | BODY MASS INDEX: 26.12 KG/M2 | WEIGHT: 153 LBS | TEMPERATURE: 97.2 F | OXYGEN SATURATION: 100 % | SYSTOLIC BLOOD PRESSURE: 125 MMHG | DIASTOLIC BLOOD PRESSURE: 74 MMHG

## 2021-03-22 DIAGNOSIS — Z79.899 HIGH RISK MEDICATION USE: ICD-10-CM

## 2021-03-22 DIAGNOSIS — F51.04 PSYCHOPHYSIOLOGICAL INSOMNIA: ICD-10-CM

## 2021-03-22 DIAGNOSIS — Z79.899 LONG-TERM CURRENT USE OF HIGH RISK MEDICATION OTHER THAN ANTICOAGULANT: ICD-10-CM

## 2021-03-22 DIAGNOSIS — E55.9 VITAMIN D DEFICIENCY: ICD-10-CM

## 2021-03-22 DIAGNOSIS — G43.009 MIGRAINE WITHOUT AURA AND WITHOUT STATUS MIGRAINOSUS, NOT INTRACTABLE: ICD-10-CM

## 2021-03-22 DIAGNOSIS — F41.9 ANXIETY: ICD-10-CM

## 2021-03-22 DIAGNOSIS — G35 MULTIPLE SCLEROSIS (HCC): Primary | ICD-10-CM

## 2021-03-22 PROBLEM — N63.0 BREAST LUMP: Status: ACTIVE | Noted: 2021-03-22

## 2021-03-22 PROBLEM — N84.1 MUCOUS POLYP OF CERVIX: Status: ACTIVE | Noted: 2021-03-22

## 2021-03-22 PROCEDURE — 99215 OFFICE O/P EST HI 40 MIN: CPT | Performed by: PSYCHIATRY & NEUROLOGY

## 2021-03-22 RX ORDER — ZOLPIDEM TARTRATE 5 MG/1
TABLET ORAL
COMMUNITY
Start: 2021-03-17

## 2021-03-22 NOTE — LETTER
3/22/2021 Patient: Michelle Stark YOB: 1970 Date of Visit: 3/22/2021 Omero Rangel MD 
[address] [place] [address] [address] [place], [place] [id number] Via Fax: [phone] Dear Omero Rangel MD, Thank you for referring Ms. Rekha Rucker to [address] [place] [address] for evaluation. My notes for this consultation are attached. If you have questions, please do not hesitate to call me. I look forward to following your patient along with you. Sincerely, Kathleen Staples NP

## 2021-03-22 NOTE — PATIENT INSTRUCTIONS
As per discussion It is safe for you to get the Shingrix vaccine for shingles Additionally I do want you to get the Covid vaccination In a perfect world it would be time to be about 4 weeks prior to your next infusion but the [place] saying it safe and effective to get about 12 weeks after your last infusion as you should have enough B cells rebuilt to develop an adequate antibody response Per discussion I do strongly recommend that you get the Covid vaccination when it becomes available to you there are no contraindications from a neurologic perspective or from a medical perspective based on our medical history review. Although you may want to discuss further with your primary care or another specialist to make sure there is no other concerns related to your health condition. Below is a website you can go to to get further information about the vaccine and vaccine scheduling through the state website and indirectly through [facility] system 
 
[email or web address] 
 
 
 
Next Steps: 
 COVID-19 vaccine appointments are not available through our practice. As you're eligible to receive the COVID-19 vaccine, as determined by your state's department of health, you will be able to schedule an appointment through [address] or by calling [phone]. Appointments are required to receive the COVID-19 vaccine. As vaccine supply continues to be limited, we anticipate open appointments to fill up quickly and appreciate your patience as we work through the process of providing vaccines to those in our communities  Community members should complete the interest form on the [facility] website and monitor their local health district website to learn more about additional vaccine distribution opportunities. Office Policies 
 
o Phone calls/patient messages: Please allow up to 24 hours for someone in the office to contact you about your call or message.  Be mindful your provider may be out of the office or your message may require further review. We encourage you to use [employer]ve for your messages as this is a faster, more efficient way to communicate with our office 
 
o Medication Refills: 
Prescription medications require up to 48 business hours to process. We encourage you to use allyve for your refills. For controlled medications: Please allow up to 72 business hours to process. Certain medications may require you to  a written prescription at our office. NO narcotic/controlled medications will be prescribed after 4pm Monday through Friday or on weekends 
 
o Form/Paperwork Completion: We ask that you allow 7-14 business days. You may also download your forms to allyve to have your doctor print off.

## 2021-03-22 NOTE — PROGRESS NOTES
[facility]  In Office FOLLOW-UP VISIT         Vero Clark is a 45 y.o. female who presents today for the following:  Chief Complaint   Patient presents with    Follow-up    Multiple Sclerosis         ASSESSMENT AND PLAN  Multiple sclerosis  Stable and doing excessively well on the use of Ocrevus  Tolerating treatment without difficulties    Migraines  Well controlled with Fiorinal provided by PCP    Anxiety  We discussed behavioral intervention techniques to help reduce day-to-day stress levels    Insomnia  Continue Ambien as needed  Discussed sleep hygiene    Covid and vaccine counseling was discussed as it relates to the patient's medical condition and current medications. Patient has been encouraged to get the vaccine when available  Also discussed the Shingrix vaccine: This is safe in the use of MS patients    All vaccines in a perfect world should be given prior to or is close to her next Ocrevus infusion even if it means delaying the infusion by several weeks  However guidance has suggested that vaccines can be given 3 months after prior infusion if necessary    Vitamin D deficiency  Most recent level 71  This is excellent would like to see our patients at least greater than 60  She is to continue on 2000 units/day      ICD-10-CM ICD-9-CM    1. Multiple sclerosis ([address]  G35 340    2. High risk medication use  Z79.899 V58.69    3. Long-term current use of high risk medication other than anticoagulant  Z79.899 V58.69    4. Psychophysiological insomnia  F51.04 307.42    5. Anxiety  F41.9 300.00    6. Migraine without aura and without status migrainosus, not intractable  G43.009 346.10    7.  Vitamin D deficiency  E55.9 268.9          I attest that 36 page 1 of 3 March 1, 2021 labs page 2 of 3 March for March 1, 2021 labs from March 21, 2020 labs from March 21 first 2021 minutes was spent on today's visit reviewing medical records and diagnostic testing deemed pertinent to this patient's care, along with direct time spent at patient's visit including the history, physical assessment and plan, discussing diagnosis and management along with documentation. HPI  Historical Data  Patient is known to me from my prior practice and is followed for a diagnosis of MS  She has relapsing remitting MS     Ocrevus: Induction infusion  competed September 8 and 22, 2017  Infusion  #2 completed March 16th, 2018  Infusion #3 Sept 21 2018  Infusion #4 March 25, 2019  Infusion #5 September 20, 2019 [bioscript on [address]]  Infusion #6 March 25, 2020 (by a prescription on [address] [place] [address] in [place])  Infusion #7Sept 18, 4174  Infusion #8 March 19 2021  Infusion #9 September 2021: Pending     Denies SE   On Michelle Bañuelos: feels Melven Dural fog\" gone; cognition more clear     Prior DMT  Copaxone 40mg 3x/week  Copaxone 20mg daily     7/10/17. JCV: negative. Hep B: non-reactive. Pt's blood work panel[CBC, CMP, VZV, JCV, Hep B] of 7/10/17 including Hep B was normal/non-reactive.      Multiple sclerosis (MS) symptoms are stable. Since last visit, there has been nothing to suggest an MS relapse. No evidence of infeciton     Denies: B/B, mood, Cognition, fatigue, sleep issues, HAs and Migraines, Numbness and paresthesia, weakness,      11/2018: ER for final dx of Gastroenteritis, near syncope and dehydration  [EMR reviewed OV 4/30/19]   WBC: 15.0  Ketones 20 in Urine  Abd/pelvic CT: inflammatory/ infectious enteritis  D/C home wo further event  Per pt: was being treated for rash w Minocin and per pt PCP states due to a reaction to Abx        6/2017  Copaxone injection reaction [see below]  Stress and Echo: WNL. Pt was seen 6/21/17 due to Copaxone injection reaction. Monday night pt injected as usual.   Had a bit of blood from injection site that happens from time to time per pt report.    But it was a bit more than usual.   She remembers not feeling well and having a burning sensation in her chest and tachycardia [she had had it once before and Dx as IPIR] but this time she felt so poorly that she laid down and she got severe low back pain  abdominal cramping and her face felt hot turned red and began to swell. She never felt like her breathing was impaired or her throat was closing or could not breath. 911 was called. By the time she arrived she was feeling some better and did not go to the hospital.   She did call me, [as she has my personal phone number] and recounted the  above data. She was advised NOT to take the Copaxone again [and she has not]. She was advised to take Benadryl that night [which she did] and follow up during the day with OTC non-sedating histamine down regulator such as Claritin, Zyrtec etc.   She is taking Claritin daily. And she was advised if sx should worsen again or breathing feels compromised to go to the ER. And she was advised to see me in the office this week for evaluation and discussion of change in DMT [and she is here today to do so]. At f/u visit:  she feels worn out and that she was \"hit by a truck\". and still achy all over. She expresses concern about possible cardiac damage.   Stress test was odered  pt with significant reaction to Copaxone.      I STILL  think it is more of an allergic reaction than simply a IPIR.      Pt had facial swelling.      She has had a IPIR prior to this when on the 20mg daily injection: that one was similar but not as severe and no facial flushing or swelling and did not take a week to recover.      And I would not expect an IPIR to escalate.      She hit a superficial vein with this last one so it is possible that may have caused more rapid absorption to magnify event but there is no way to know and I lean toward it not being a major player.      There is NO WAY to monitor for superficial vein distribution so in-office monitoring would not be beneficial.            2011: + Lyme titer; worked up by Dr. Kristen Herrera and felt to be a false + reading.     4/23/18  MRI Head w/wo contrast  Comparision: 5/19/17  Unchanged        5/19/17. MRI Head. NO new lesions identified. No acute changes. NO enhancing lesions.      Repeat MRI Brain and C-spine  1/2016:. No new lesions, no enhancing lesions, no interval change :).         8/26/15. MRI BRain w/wo. no acute changes. no new lesions.         7/8/15. MRI Brain w and wo: .   2 new small foci identified. small pineal cyst: unchanged. no acute process.         MRI C-spine w/wo.   multiple disc bulges. C5-6: mild LT and minimal Rt foramina stenosis. multiple meningeal cyst or diverticulum. Subtle T2 intensity RT lateral cervical C2 suspect consistent w mild demyelinating lesion. no acute process.      MRI T-spine w/wo. no evidence of demyelinating process: acute or chronic.   scattered multilevel minimal or small root diverticula or perineural root cysts bilaterally.      Medical Hx:  uterine ablation     Known allergies: codeine: nausea.       heat rash on chest spring 2019; also had it in November 2018 and went on an Abx and had a reaction and went to ER; Heart rate dropped and presyncope: reaction to minocycline         Other current medical issues historically: Migraines     Interim Data:     Regarding MS:  Patient is doing well has no complaints and denies any focal signs or symptoms related to MS dysfunction.   Specifically she denies any focal weakness or paresthesias, acute visual changes, hearing loss tinnitus, speech or swallowing problems, focal weakness paresthesias, bowel or bladder dysfunction, cognitive difficulties, chronic fatigue.     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 but always present  She still has bouts of insomnia but does sleep better and she is not as an emotional  She never started the Lexapro and seems to be doing okay  Does use Ambien as needed     Headaches and migraines  Infrequent usually uses Fiorinal less than once a month to abort a headache     Additional pertinent data  May 4, 2021 to [facility] ED for anaphylactic reaction to flaxseed            Treated with Benadryl EpiPen and steroids            Was not admitted to the hospital and no further sequela            Discharged home on steroids and EpiPen        No results found for this or any previous visit. Allergies   Allergen Reactions    Flaxseed Anaphylaxis    Glatiramer (Copolymer 1) Swelling     Rapid heartbeat. Burning in chest.    Codeine Nausea and Vomiting     NAUSEA    Copaxone [Glatiramer] Other (comments)     Flushing, headache, palpitations    Minocycline Other (comments)     Light headed        Current Outpatient Medications   Medication Sig    EPINEPHrine (EPIPEN) 0.3 mg/0.3 mL injection 0.3 mg by IntraMUSCular route as needed.  butalbital-aspirin-caffeine (FIORINAL) capsule Take 1 Tab by mouth every four (4) hours as needed.  multivit-mins no.63/iron/folic (M-VIT PO) Take  by mouth daily.  ocrelizumab (OCREVUS) 30 mg/mL soln injection 1 mL by IntraVENous route every 6 months. Indications: relapsing form of multiple sclerosis    cholecalciferol (VITAMIN D3) 2,000 unit cap capsule 5000 units by mouth once a day  Indications: Prevention of Vitamin D Deficiency    krill-om-3-dha-epa-phospho-ast (KRILL OIL) 1,406-078-60-80 mg cap Take 1 Cap by mouth daily.  zolpidem (AMBIEN) 5 mg tablet      No current facility-administered medications for this visit.         Past medical history/surgical history, family history, and social history have been reviewed for today's visit      ROS    A ten system review of constitutional, cardiovascular, respiratory, musculoskeletal, endocrine, skin, SHEENT, genitourinary, psychiatric and neurologic systems was obtained and is unremarkable except as mentioned under HPI          EXAMINATION:     Visit Vitals  /74   Pulse 67   Temp 97.2 °F (36.2 °C)   Ht 5' 4\" (1.626 m)   Wt 153 lb (69.4 kg)   SpO2 100%   BMI 26.26 kg/m²         General appearance: Patient is well-developed and well-nourished in no apparent distress and well groomed. Psych/mental health:  Affect: Appropriate    PHQ  3 most recent PHQ Screens 9/21/2020   Little interest or pleasure in doing things Not at all   Feeling down, depressed, irritable, or hopeless Not at all   Total Score PHQ 2 0       HEENT:   Normocephalic  With evidence of trauma: No  Full range of motion head neck: Yes  Tenderness to palpation of the head neck region: N/A      Cardiovascular:     Extremities warm to touch: Yes  Extremity swelling: No  Discoloration: No  Evidence of PVD: No    Respiratory:   Dyspnea on exertion: No   Abnormal effort on casual observation: No   Use of portable oxygen: No   Evidence of cyanosis: No     Musculoskeletal:   Evidence of significant bone deformities: No   Spinal curvature: No     Integumentary:    Obvious bruising: No   Lacerations or discoloration on casual observation: No       Neurological Examination:   Mental Status:        MMSE  No flowsheet data found. Formal testing was not completed    there was nothing concerning on general observation and discussion.    Alert oriented and appropriate to general conversation  Normal processing on general observation  Followed conversation and responded seemingly appropriate throughout the office visit  No word finding difficulties noted on casual observation  Able to follow directions without difficulty     Cranial Nerves:    EOMs intact gaze is conjugate  No nystagmus is appreciated  Facial motor intact bilaterally  Hearing intact to conversation  Voice with normal projection, no evidence of secretion pooling  Shoulder shrug intact bilaterally  No tongue deviation appreciated     Motor:   Normal bulk  No tremor appreciated on today's exam  No abnormal movements appreciated on today's exam  Moves extremities spontaneously and with purpose  5/5 x 4      Sensation: Intact to light touch    Coordination/Cerebellar:   FTN: Intact bilaterally    Gait: Ambulates independently    Reflexes: Symmetrical    Fall risk assessment  No flowsheet data found. Follow-up and Dispositions    · Return in about 6 months (around 9/22/2021) for In office appointment.            Lynette Nieto MS, ANP-BC, [facility]

## 2021-09-17 ENCOUNTER — TELEPHONE (OUTPATIENT)
Dept: NEUROLOGY | Age: 51
End: 2021-09-17

## 2021-09-27 ENCOUNTER — OFFICE VISIT (OUTPATIENT)
Dept: NEUROLOGY | Age: 51
End: 2021-09-27
Payer: COMMERCIAL

## 2021-09-27 VITALS
WEIGHT: 151 LBS | SYSTOLIC BLOOD PRESSURE: 104 MMHG | DIASTOLIC BLOOD PRESSURE: 72 MMHG | HEIGHT: 64 IN | BODY MASS INDEX: 25.78 KG/M2 | OXYGEN SATURATION: 98 % | HEART RATE: 74 BPM | RESPIRATION RATE: 16 BRPM

## 2021-09-27 DIAGNOSIS — E55.9 VITAMIN D DEFICIENCY: ICD-10-CM

## 2021-09-27 DIAGNOSIS — G43.009 MIGRAINE WITHOUT AURA AND WITHOUT STATUS MIGRAINOSUS, NOT INTRACTABLE: ICD-10-CM

## 2021-09-27 DIAGNOSIS — G35 MULTIPLE SCLEROSIS (HCC): Primary | ICD-10-CM

## 2021-09-27 DIAGNOSIS — Z79.899 LONG-TERM CURRENT USE OF HIGH RISK MEDICATION OTHER THAN ANTICOAGULANT: ICD-10-CM

## 2021-09-27 DIAGNOSIS — F41.9 ANXIETY: ICD-10-CM

## 2021-09-27 DIAGNOSIS — F51.04 PSYCHOPHYSIOLOGICAL INSOMNIA: ICD-10-CM

## 2021-09-27 PROCEDURE — 99215 OFFICE O/P EST HI 40 MIN: CPT | Performed by: PSYCHIATRY & NEUROLOGY

## 2021-09-27 NOTE — ASSESSMENT & PLAN NOTE
Target goal is to stay greater and 60  Can check level at next blood draw  For now continue on 2000 units/day

## 2021-09-27 NOTE — PATIENT INSTRUCTIONS
Office Policies    o Phone calls/patient messages:  Please allow up to 24 hours for someone in the office to contact you about your call or message. Be mindful your provider may be out of the office or your message may require further review. We encourage you to use [employer] for your messages as this is a faster, more efficient way to communicate with our office    o Medication Refills:  Prescription medications require up to 48 business hours to process. We encourage you to use [employer] for your refills. For controlled medications: Please allow up to 72 business hours to process. Certain medications may require you to  a written prescription at our office. NO narcotic/controlled medications will be prescribed after 4pm Monday through Friday or on weekends    o Form/Paperwork Completion:  We ask that you allow 7-14 business days. You may also download your forms to [employer] to have your doctor print off.

## 2021-09-27 NOTE — ASSESSMENT & PLAN NOTE
Stable and doing excessively well on the use of Ocrevus tolerating the treatment without difficulties or side effects    Has remained clinically and radiographically stable over time since starting Ocrevus    Patient denies signs or symptoms related to infection  She has been vaccinated for Covid    Discussed travel and continuation of stringent social distancing mask and handwashing

## 2021-09-27 NOTE — PROGRESS NOTES
Chief Complaint   Patient presents with    Multiple Sclerosis     follow up     1. Have you been to the ER, urgent care clinic since your last visit? Hospitalized since your last visit? No    2. Have you seen or consulted any other health care providers outside of the [address] [place] [address] since your last visit? Include any pap smears or colon screening.  No    Visit Vitals  /72 (BP 1 Location: Left arm, BP Patient Position: Sitting)   Pulse 74   Resp 16   Ht 5' 4\" (1.626 m)   Wt 151 lb (68.5 kg)   SpO2 98%   BMI 25.92 kg/m²

## 2021-09-27 NOTE — ASSESSMENT & PLAN NOTE
Morris Deed which is patient's drug modifying therapy is clearly a high risk medication due to its immunosuppressive therapies    Recommend patient get Covid booster shot when appropriate and follow-up with PCP regarding this    Continue to maintain standard Covid precautions especially when traveling

## 2021-09-27 NOTE — PROGRESS NOTES
[facility] 82  In Office FOLLOW-UP VISIT         Fany Contreras is a 45 y.o. female who presents today for the following:  Chief Complaint   Patient presents with    Multiple Sclerosis     follow up         ASSESSMENT AND PLAN    1. Multiple sclerosis ([address]  Assessment & Plan:  Stable and doing excessively well on the use of Ocrevus tolerating the treatment without difficulties or side effects    Has remained clinically and radiographically stable over time since starting Ocrevus    Patient denies signs or symptoms related to infection  She has been vaccinated for Covid    Discussed travel and continuation of stringent social distancing mask and handwashing  2. Long-term current use of high risk medication other than anticoagulant  Assessment & Plan:   Mariah Senior which is patient's drug modifying therapy is clearly a high risk medication due to its immunosuppressive therapies    Recommend patient get Covid booster shot when appropriate and follow-up with PCP regarding this    Continue to maintain standard Covid precautions especially when traveling  3. Psychophysiological insomnia  Assessment & Plan:   Much improved as patient stress levels have reduced  Continue with Ambien as needed  4. Migraine without aura and without status migrainosus, not intractable  Assessment & Plan:   No preventative medication continues on Fioricet prescribed by PCP  5. Anxiety  Assessment & Plan:   Overall doing well  On no medications at this time  6. Vitamin D deficiency  Assessment & Plan:   Target goal is to stay greater and 60  Can check level at next blood draw  For now continue on 2000 units/day      Patient and/or family was given time to ask questions and voice concerns. I believe all questions concerns were adequately addressed at this  office visit.   Patient and/or family also verbalized agreement and understanding of the above-stated plan    Patient remains a complex patient secondary to polypharmacy, significant comorbid conditions, and use of high-risk medications which complicate the decision making process related to patient's neurologic diagnosis      ICD-10-CM ICD-9-CM    1. Multiple sclerosis ([facility] [address]  G35 340    2. Long-term current use of high risk medication other than anticoagulant  Z79.899 V58.69    3. Psychophysiological insomnia  F51.04 307.42    4. Migraine without aura and without status migrainosus, not intractable  G43.009 346.10    5. Anxiety  F41.9 300.00    6. Vitamin D deficiency  E55.9 268.9                HPI  Historical Data  Patient is known to me from my prior practice and is followed for a diagnosis of MS  She has relapsing remitting MS     Ocrevus: Induction infusion  competed September 8 and 22, 2017  Infusion  #2 completed March 16th, 2018  Infusion #3 Sept 21 2018  Infusion #4 March 25, 2019  Infusion #5 September 20, 2019 [bioscript on [facility]]  Infusion #6 March 25, 2020 (by a prescription on [address] [place] [address] in [place])  Infusion #7Sept 18, 7240  Infusion #8 March 19 2021  Infusion #9 September 2021  Infusion # 10 March 25 2022: Pending     Denies SE   On Ocrevus: feels \"brain fog\" gone; cognition more clear     Prior DMT  Copaxone 40mg 3x/week  Copaxone 20mg daily     7/10/17. JCV: negative. Hep B: non-reactive. Pt's blood work panel[CBC, CMP, VZV, JCV, Hep B] of 7/10/17 including Hep B was normal/non-reactive.      Multiple sclerosis (MS) symptoms are stable. Since last visit, there has been nothing to suggest an MS relapse.   No evidence of infeciton     Denies: B/B, mood, Cognition, fatigue, sleep issues, HAs and Migraines, Numbness and paresthesia, weakness,      11/2018: ER for final dx of Gastroenteritis, near syncope and dehydration  [EMR reviewed OV 4/30/19]   WBC: 15.0  Ketones 20 in Urine  Abd/pelvic CT: inflammatory/ infectious enteritis  D/C home wo further event  Per pt: was being treated for rash w Minocin and per pt PCP states due to a reaction to Abx        6/2017  Copaxone injection reaction [see below]  Stress and Echo: WNL. Pt was seen 6/21/17 due to Copaxone injection reaction. Monday night pt injected as usual.   Had a bit of blood from injection site that happens from time to time per pt report. But it was a bit more than usual.   She remembers not feeling well and having a burning sensation in her chest and tachycardia [she had had it once before and Dx as IPIR] but this time she felt so poorly that she laid down and she got severe low back pain  abdominal cramping and her face felt hot turned red and began to swell. She never felt like her breathing was impaired or her throat was closing or could not breath. 911 was called. By the time she arrived she was feeling some better and did not go to the hospital.   She did call me, [as she has my personal phone number] and recounted the  above data. She was advised NOT to take the Copaxone again [and she has not]. She was advised to take Benadryl that night [which she did] and follow up during the day with OTC non-sedating histamine down regulator such as Claritin, Zyrtec etc.   She is taking Claritin daily. And she was advised if sx should worsen again or breathing feels compromised to go to the ER. And she was advised to see me in the office this week for evaluation and discussion of change in DMT [and she is here today to do so]. At f/u visit:  she feels worn out and that she was \"hit by a truck\". and still achy all over. She expresses concern about possible cardiac damage. Stress test was odered  pt with significant reaction to Copaxone.      I STILL  think it is more of an allergic reaction than simply a IPIR.      Pt had facial swelling.      She has had a IPIR prior to this when on the 20mg daily injection: that one was similar but not as severe and no facial flushing or swelling and did not take a week to recover.      And I would not expect an IPIR to escalate.    She hit a superficial vein with this last one so it is possible that may have caused more rapid absorption to magnify event but there is no way to know and I lean toward it not being a major player.      There is NO WAY to monitor for superficial vein distribution so in-office monitoring would not be beneficial.            2011: + Lyme titer; worked up by Dr. Liya Mancuso and felt to be a false + reading.     4/23/18  MRI Head w/wo contrast  Comparision: 5/19/17  Unchanged        5/19/17. MRI Head. NO new lesions identified. No acute changes. NO enhancing lesions.      Repeat MRI Brain and C-spine  1/2016:. No new lesions, no enhancing lesions, no interval change :).         8/26/15. MRI BRain w/wo. no acute changes. no new lesions.         7/8/15. MRI Brain w and wo: .   2 new small foci identified. small pineal cyst: unchanged. no acute process.         MRI C-spine w/wo.   multiple disc bulges. C5-6: mild LT and minimal Rt foramina stenosis. multiple meningeal cyst or diverticulum. Subtle T2 intensity RT lateral cervical C2 suspect consistent w mild demyelinating lesion. no acute process.      MRI T-spine w/wo. no evidence of demyelinating process: acute or chronic.   scattered multilevel minimal or small root diverticula or perineural root cysts bilaterally.      Medical Hx:  uterine ablation     Known allergies: codeine: nausea.       heat rash on chest spring 2019; also had it in November 2018 and went on an Abx and had a reaction and went to ER;  Heart rate dropped and presyncope: reaction to minocycline         Other current medical issues historically: Migraines    May 4, 2021 to [facility] ED for anaphylactic reaction to flaxseed            Treated with Benadryl EpiPen and steroids            Was not admitted to the hospital and no further sequela            Discharged home on steroids and EpiPen     Interim Data:     Regarding MS:  Patient is doing well has no complaints and denies any focal signs or symptoms related to MS dysfunction. Specifically she denies any focal weakness or paresthesias, acute visual changes, hearing loss tinnitus, speech or swallowing problems, focal weakness paresthesias, bowel or bladder dysfunction, cognitive difficulties, chronic fatigue.     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 Allergies   Allergen Reactions    Flaxseed Anaphylaxis    Glatiramer (Copolymer 1) Swelling     Rapid heartbeat. Burning in chest.    Codeine Nausea and Vomiting     NAUSEA    Copaxone [Glatiramer] Other (comments)     Flushing, headache, palpitations    Minocycline Other (comments)     Light headed        Current Outpatient Medications   Medication Sig    zolpidem (AMBIEN) 5 mg tablet nightly as needed.  EPINEPHrine (EPIPEN) 0.3 mg/0.3 mL injection 0.3 mg by IntraMUSCular route as needed.  butalbital-aspirin-caffeine (FIORINAL) capsule Take 1 Tab by mouth every four (4) hours as needed.  multivit-mins no.63/iron/folic (M-VIT PO) Take  by mouth daily.  ocrelizumab (OCREVUS) 30 mg/mL soln injection 1 mL by IntraVENous route every 6 months. Indications: relapsing form of multiple sclerosis    cholecalciferol (VITAMIN D3) 2,000 unit cap capsule 5000 units by mouth once a day  Indications: Prevention of Vitamin D Deficiency    krill-om-3-dha-epa-phospho-ast (KRILL OIL) 1,465-451-63-80 mg cap Take 1 Cap by mouth daily. No current facility-administered medications for this visit.        Past medical history/surgical history, family history, and social history have been reviewed for today's visit      ROS    A ten system review of constitutional, cardiovascular, respiratory, musculoskeletal, endocrine, skin, SHEENT, genitourinary, psychiatric and neurologic systems was obtained and is unremarkable except as mentioned under HPI          EXAMINATION:     Visit Vitals  /72 (BP 1 Location: Left arm, BP Patient Position: Sitting)   Pulse 74   Resp 16   Ht 5' 4\" (1.626 m)   Wt 151 lb (68.5 kg)   SpO2 98%   BMI 25.92 kg/m²         General appearance: Patient is well-developed and well-nourished in no apparent distress and well groomed. Psych/mental health:  Affect: Appropriate    PHQ  3 most recent PHQ Screens 9/27/2021   Little interest or pleasure in doing things Not at all   Feeling down, depressed, irritable, or hopeless Not at all   Total Score PHQ 2 0       HEENT:   Normocephalic  With evidence of trauma: No  Full range of motion head neck: Yes  Tenderness to palpation of the head neck region: N/A      Cardiovascular:     Extremities warm to touch: Yes  Extremity swelling: No  Discoloration: No  Evidence of PVD: No    Respiratory:   Dyspnea on exertion: No   Abnormal effort on casual observation: No   Use of portable oxygen: No   Evidence of cyanosis: No     Musculoskeletal:   Evidence of significant bone deformities: No   Spinal curvature: No     Integumentary:    Obvious bruising: No   Lacerations or discoloration on casual observation: No       Neurological Examination:   Mental Status:        MMSE  No flowsheet data found. Formal testing was not completed    there was nothing concerning on general observation and discussion.    Alert oriented and appropriate to general conversation  Normal processing on general observation  Followed conversation and responded seemingly appropriate throughout the office visit  No word finding difficulties noted on casual observation  Able to follow directions without difficulty Cranial Nerves:    EOMs intact gaze is conjugate  No nystagmus is appreciated  Facial motor intact bilaterally  Hearing intact to conversation  Voice with normal projection, no evidence of secretion pooling  Shoulder shrug intact bilaterally  No tongue deviation appreciated     Motor:   Normal bulk  No tremor appreciated on today's exam  No abnormal movements appreciated on today's exam  Moves extremities spontaneously and with purpose  5/5 x 4      Sensation: Intact to light touch    Coordination/Cerebellar:   FTN: Intact bilaterally    Gait: Ambulates independently    Reflexes: Symmetrical    Fall risk assessment  No flowsheet data found. Follow-up and Dispositions    · Return in about 6 months (around 3/27/2022) for In office appointment.            Briana Mcclain MS, ANP-BC, [facility]

## 2021-09-27 NOTE — LETTER
9/27/2021    Patient: Jesscia De La Fuente   YOB: 1970   Date of Visit: 9/27/2021     Anthony Dersa MD  [address] [place] [id number]  Via Fax: [phone]    Dear Anthony Deras MD,      Thank you for referring Ms. Olya Burch to [address] [place] [address] for evaluation. My notes for this consultation are attached. If you have questions, please do not hesitate to call me. I look forward to following your patient along with you.       Sincerely,    Shailesh Meneses NP

## 2022-03-18 PROBLEM — E55.9 VITAMIN D DEFICIENCY: Status: ACTIVE | Noted: 2021-09-27

## 2022-03-18 PROBLEM — F41.9 ANXIETY: Status: ACTIVE | Noted: 2021-09-27

## 2022-03-18 PROBLEM — G43.009 MIGRAINE WITHOUT AURA AND WITHOUT STATUS MIGRAINOSUS, NOT INTRACTABLE: Status: ACTIVE | Noted: 2019-04-10

## 2022-03-19 PROBLEM — J01.10 ACUTE NON-RECURRENT FRONTAL SINUSITIS: Status: ACTIVE | Noted: 2019-04-10

## 2022-03-19 PROBLEM — Z79.899 LONG-TERM CURRENT USE OF HIGH RISK MEDICATION OTHER THAN ANTICOAGULANT: Status: ACTIVE | Noted: 2021-09-27

## 2022-03-19 PROBLEM — F51.04 PSYCHOPHYSIOLOGICAL INSOMNIA: Status: ACTIVE | Noted: 2021-09-27

## 2022-03-19 PROBLEM — N63.0 BREAST LUMP: Status: ACTIVE | Noted: 2021-03-22

## 2022-03-19 PROBLEM — N84.1 MUCOUS POLYP OF CERVIX: Status: ACTIVE | Noted: 2021-03-22

## 2022-03-19 PROBLEM — N20.0 CALCULUS OF KIDNEY: Status: ACTIVE | Noted: 2019-04-10

## 2022-03-20 PROBLEM — G35 MULTIPLE SCLEROSIS (HCC): Status: ACTIVE | Noted: 2019-04-10

## 2022-03-28 ENCOUNTER — OFFICE VISIT (OUTPATIENT)
Dept: NEUROLOGY | Age: 52
End: 2022-03-28
Payer: COMMERCIAL

## 2022-03-28 VITALS
HEIGHT: 64 IN | WEIGHT: 153 LBS | BODY MASS INDEX: 26.12 KG/M2 | TEMPERATURE: 97.7 F | RESPIRATION RATE: 18 BRPM | OXYGEN SATURATION: 95 % | HEART RATE: 92 BPM | DIASTOLIC BLOOD PRESSURE: 70 MMHG | SYSTOLIC BLOOD PRESSURE: 110 MMHG

## 2022-03-28 DIAGNOSIS — G43.009 MIGRAINE WITHOUT AURA AND WITHOUT STATUS MIGRAINOSUS, NOT INTRACTABLE: ICD-10-CM

## 2022-03-28 DIAGNOSIS — G35 MULTIPLE SCLEROSIS (HCC): Primary | ICD-10-CM

## 2022-03-28 DIAGNOSIS — E55.9 VITAMIN D DEFICIENCY: ICD-10-CM

## 2022-03-28 DIAGNOSIS — Z79.899 LONG-TERM CURRENT USE OF HIGH RISK MEDICATION OTHER THAN ANTICOAGULANT: ICD-10-CM

## 2022-03-28 DIAGNOSIS — Z11.59 ENCOUNTER FOR SCREENING FOR OTHER VIRAL DISEASES: ICD-10-CM

## 2022-03-28 PROCEDURE — 99215 OFFICE O/P EST HI 40 MIN: CPT | Performed by: PSYCHIATRY & NEUROLOGY

## 2022-03-28 NOTE — PROGRESS NOTES
[facility] [address]  In Office FOLLOW-UP VISIT         Ryan Rhodes is a 45 y.o. female who presents today for the following:  Chief Complaint   Patient presents with    Follow-up         ASSESSMENT AND PLAN    1. Multiple sclerosis ([address]  Assessment & Plan:  Stable and well-controlled on Ocrevus tolerating well without side effects and without evidence of problems with reoccurring infections    Has remained clinically and radiographically stable but she is due for neuro imaging and blood work as this is a high risk medication and needs to be followed closely    She has been vaccinated with her booster for COVID 19    We did discuss Shingrix I have asked her to further discuss this with her PCP  Orders:  -     CBC WITH AUTOMATED DIFF; Future  -     METABOLIC PANEL, COMPREHENSIVE; Future  -     STRATIFY JCV (TM) B W/INDEX  -     MRI BRAIN W WO CONT; Future  2. Encounter for screening for other viral diseases  -     STRATIFY JCV (TM) B W/INDEX  3. Vitamin D deficiency  Assessment & Plan:   Target goal to be greater than 60  She continues on her supplementation  Blood work ordered  Orders:  -     VITAMIN D, 25 HYDROXY; Future  4. Migraine without aura and without status migrainosus, not intractable  Assessment & Plan:   No preventative medication necessary  Patient uses Fioricet as needed prescribed by her PCP  5.  Long-term current use of high risk medication other than anticoagulant  Assessment & Plan:   Patient is on high risk immunosuppressive therapy of Ocrevus  Blood work and MRI scan ordered    Continue COVID 19/universal precautions    We did discuss her negative antibody response to her vaccines  Guidance from [facility] still supports the vaccinations and boosters as appropriate for patients with MS on Ocrevus as there is some evidence that secondary immune responses do occur in relationship to the the COVID 19 vaccination/boosters that would give some protection against COVID 19 but is just not standardly measured with our current tools    This was discussed with the patient at today's office visit      Bobby is something we can consider but this is only been authorized this emergency use   This was not discussed at this point in time with the patient as we are still gathering data regarding this in our practice      Patient and/or family was given time to ask questions and voice concerns. I believe all questions concerns were adequately addressed at this  office visit. Patient and/or family also verbalized agreement and understanding of the above-stated plan    Patient remains a complex patient secondary to polypharmacy, significant comorbid conditions, and use of high-risk medications which complicate the decision making process related to patient's neurologic diagnosis      ICD-10-CM ICD-9-CM    1. Multiple sclerosis ([facility] [address]  G35 340 CBC WITH AUTOMATED DIFF      METABOLIC PANEL, COMPREHENSIVE      STRATIFY JCV (TM) B W/INDEX      MRI BRAIN W WO CONT      CANCELED: MRI BRAIN W WO CONT   2. Encounter for screening for other viral diseases  Z11.59 V73.89 STRATIFY JCV (TM) B W/INDEX   3. Vitamin D deficiency  E55.9 268.9 VITAMIN D, 25 HYDROXY   4. Migraine without aura and without status migrainosus, not intractable  G43.009 346.10    5. Long-term current use of high risk medication other than anticoagulant  Z79.899 V58.69                HPI  Historical Data  Patient is known to me from my prior practice and is followed for a diagnosis of MS  She has relapsing remitting MS          Denies SE   On [place] Bowl: feels \"brain fog\" gone; cognition more clear     Prior DMT  Copaxone 40mg 3x/week  Copaxone 20mg daily     7/10/17. JCV: negative. Hep B: non-reactive. Pt's blood work panel[CBC, CMP, VZV, JCV, Hep B] of 7/10/17 including Hep B was normal/non-reactive.      Multiple sclerosis (MS) symptoms are stable. Since last visit, there has been nothing to suggest an MS relapse.   No evidence of infeciton     Denies: B/B, mood, Cognition, fatigue, sleep issues, HAs and Migraines, Numbness and paresthesia, weakness,      11/2018: ER for final dx of Gastroenteritis, near syncope and dehydration  [EMR reviewed OV 4/30/19]   WBC: 15.0  Ketones 20 in Urine  Abd/pelvic CT: inflammatory/ infectious enteritis  D/C home wo further event  Per pt: was being treated for rash w Minocin and per pt PCP states due to a reaction to Abx        6/2017  Copaxone injection reaction [see below]  Stress and Echo: WNL. Pt was seen 6/21/17 due to Copaxone injection reaction. Monday night pt injected as usual.   Had a bit of blood from injection site that happens from time to time per pt report. But it was a bit more than usual.   She remembers not feeling well and having a burning sensation in her chest and tachycardia [she had had it once before and Dx as IPIR] but this time she felt so poorly that she laid down and she got severe low back pain  abdominal cramping and her face felt hot turned red and began to swell. She never felt like her breathing was impaired or her throat was closing or could not breath. 911 was called. By the time she arrived she was feeling some better and did not go to the hospital.   She did call me, [as she has my personal phone number] and recounted the  above data. She was advised NOT to take the Copaxone again [and she has not]. She was advised to take Benadryl that night [which she did] and follow up during the day with OTC non-sedating histamine down regulator such as Claritin, Zyrtec etc.   She is taking Claritin daily. And she was advised if sx should worsen again or breathing feels compromised to go to the ER. And she was advised to see me in the office this week for evaluation and discussion of change in DMT [and she is here today to do so]. At f/u visit:  she feels worn out and that she was \"hit by a truck\". and still achy all over.    She expresses concern about possible cardiac damage. Stress test was odered  pt with significant reaction to Copaxone.      I STILL  think it is more of an allergic reaction than simply a IPIR.      Pt had facial swelling.      She has had a IPIR prior to this when on the 20mg daily injection: that one was similar but not as severe and no facial flushing or swelling and did not take a week to recover.      And I would not expect an IPIR to escalate.      She hit a superficial vein with this last one so it is possible that may have caused more rapid absorption to magnify event but there is no way to know and I lean toward it not being a major player.      There is NO WAY to monitor for superficial vein distribution so in-office monitoring would not be beneficial.            2011: + Lyme titer; worked up by Dr. Vivian Sánchez and felt to be a false + reading.     4/23/18  MRI Head w/wo contrast  Comparision: 5/19/17  Unchanged        5/19/17. MRI Head. NO new lesions identified. No acute changes. NO enhancing lesions.      Repeat MRI Brain and C-spine  1/2016:. No new lesions, no enhancing lesions, no interval change :).         8/26/15. MRI BRain w/wo. no acute changes. no new lesions.         7/8/15. MRI Brain w and wo: .   2 new small foci identified. small pineal cyst: unchanged. no acute process.         MRI C-spine w/wo.   multiple disc bulges. C5-6: mild LT and minimal Rt foramina stenosis. multiple meningeal cyst or diverticulum. Subtle T2 intensity RT lateral cervical C2 suspect consistent w mild demyelinating lesion. no acute process.      MRI T-spine w/wo. no evidence of demyelinating process: acute or chronic.   scattered multilevel minimal or small root diverticula or perineural root cysts bilaterally.      Medical Hx:  uterine ablation     Known allergies: codeine: nausea.       heat rash on chest spring 2019; also had it in November 2018 and went on an Abx and had a reaction and went to ER;  Heart rate dropped and presyncope: reaction to minocycline         Other current medical issues historically: Migraines    May 4, 2021 to [facility] ED for anaphylactic reaction to flaxseed            Treated with Benadryl EpiPen and steroids            Was not admitted to the hospital and no further sequela            Discharged home on steroids and EpiPen     Interim Data:     Regarding MS:    Shivani Roach: Induction infusion  competed September 8 and 22, 2017  Infusion  #2 completed March 16th, 2018  Infusion #3 Sept 21 2018  Infusion #4 March 25, 2019  Infusion #5 September 20, 2019 [bioscript on [address]]  Infusion #6 March 25, 2020 (by a prescription on [address] [place] [address] in [place])  Infusion #7Sept 18, 3424  Infusion #8 March 19 2021  Infusion #9 September 2021  Infusion # 10 March 25 2022  Infusion #11 September 2022      Patient is doing well has no complaints and denies any focal signs or symptoms related to MS dysfunction. Specifically she denies any focal weakness or paresthesias, acute visual changes, hearing loss tinnitus, speech or swallowing problems, focal weakness paresthesias, bowel or bladder dysfunction, cognitive difficulties, chronic fatigue.     States stress is manageable and she continues with insomnia as needed     Headaches and migraines  Infrequent usually uses Fiorinal less than once a month to abort a headache     Additional pertinent data  None at today's visit        No results found for this or any previous visit. Allergies   Allergen Reactions    Flaxseed Anaphylaxis    Glatiramer (Copolymer 1) Swelling     Rapid heartbeat. Burning in chest.    Codeine Nausea and Vomiting     NAUSEA    Copaxone [Glatiramer] Other (comments)     Flushing, headache, palpitations    Minocycline Other (comments)     Light headed        Current Outpatient Medications   Medication Sig    zolpidem (AMBIEN) 5 mg tablet nightly as needed.     EPINEPHrine (EPIPEN) 0.3 mg/0.3 mL injection 0.3 mg by IntraMUSCular route as needed.  butalbital-aspirin-caffeine (FIORINAL) capsule Take 1 Tab by mouth every four (4) hours as needed.  multivit-mins no.63/iron/folic (M-VIT PO) Take  by mouth daily.  ocrelizumab (OCREVUS) 30 mg/mL soln injection 1 mL by IntraVENous route every 6 months. Indications: relapsing form of multiple sclerosis    cholecalciferol (VITAMIN D3) 2,000 unit cap capsule 5000 units by mouth once a day  Indications: Prevention of Vitamin D Deficiency    krill-om-3-dha-epa-phospho-ast (KRILL OIL) 1,393-558-12-80 mg cap Take 1 Cap by mouth daily. No current facility-administered medications for this visit. Past medical history/surgical history, family history, and social history have been reviewed for today's visit      ROS    A ten system review of constitutional, cardiovascular, respiratory, musculoskeletal, endocrine, skin, SHEENT, genitourinary, psychiatric and neurologic systems was obtained and is unremarkable except as mentioned under HPI          EXAMINATION:     Visit Vitals  /70   Pulse 92   Temp 97.7 °F (36.5 °C)   Resp 18   Ht 5' 4\" (1.626 m)   Wt 153 lb (69.4 kg)   SpO2 95%   BMI 26.26 kg/m²         General appearance: Patient is well-developed and well-nourished in no apparent distress and well groomed.     Psych/mental health:  Affect: Appropriate    PHQ  3 most recent PHQ Screens 3/28/2022   Little interest or pleasure in doing things Not at all   Feeling down, depressed, irritable, or hopeless Not at all   Total Score PHQ 2 0       HEENT:   Normocephalic  With evidence of trauma: No  Full range of motion head neck: Yes  Tenderness to palpation of the head neck region: N/A      Cardiovascular:     Extremities warm to touch: Yes  Extremity swelling: No  Discoloration: No  Evidence of PVD: No    Respiratory:   Dyspnea on exertion: No   Abnormal effort on casual observation: No   Use of portable oxygen: No   Evidence of cyanosis: No     Musculoskeletal:   Evidence of significant bone deformities: No   Spinal curvature: No     Integumentary:    Obvious bruising: No   Lacerations or discoloration on casual observation: No       Neurological Examination:   Mental Status:        MMSE  No flowsheet data found. Formal testing was not completed    there was nothing concerning on general observation and discussion. Alert oriented and appropriate to general conversation  Normal processing on general observation  Followed conversation and responded seemingly appropriate throughout the office visit  No word finding difficulties noted on casual observation  Able to follow directions without difficulty     Cranial Nerves:    EOMs intact gaze is conjugate  No nystagmus is appreciated  Facial motor intact bilaterally  Hearing intact to conversation  Voice with normal projection, no evidence of secretion pooling  Shoulder shrug intact bilaterally  No tongue deviation appreciated     Motor:   Normal bulk  No tremor appreciated on today's exam  No abnormal movements appreciated on today's exam  Moves extremities spontaneously and with purpose  5/5 x 4      Sensation: Intact to light touch    Coordination/Cerebellar:   FTN: Intact bilaterally    Gait: Ambulates independently    Reflexes: Symmetrical    Fall risk assessment  No flowsheet data found. Follow-up and Dispositions    · Return in about 6 months (around 9/28/2022) for In office appointment.            Amisha Miller MS, ANP-BC, [facility]

## 2022-03-28 NOTE — ASSESSMENT & PLAN NOTE
Stable and well-controlled on Ocrevus tolerating well without side effects and without evidence of problems with reoccurring infections    Has remained clinically and radiographically stable but she is due for neuro imaging and blood work as this is a high risk medication and needs to be followed closely    She has been vaccinated with her booster for COVID 19    We did discuss Shingrix I have asked her to further discuss this with her PCP

## 2022-03-28 NOTE — ASSESSMENT & PLAN NOTE
Patient is on high risk immunosuppressive therapy of Ocrevus  Blood work and MRI scan ordered    Continue COVID 19/universal precautions    We did discuss her negative antibody response to her vaccines  Guidance from [facility] still supports the vaccinations and boosters as appropriate for patients with MS on [employer]  as there is some evidence that secondary immune responses do occur in relationship to the the COVID 19 vaccination/boosters that would give some protection against COVID 19 but is just not standardly measured with our current tools    This was discussed with the patient at today's office visit      Bobby is something we can consider but this is only been authorized this emergency use   This was not discussed at this point in time with the patient as we are still gathering data regarding this in our practice

## 2022-03-28 NOTE — LETTER
3/28/2022    Patient: Geetha Alexis   YOB: 1970   Date of Visit: 3/28/2022     Leanne Perez MD  [address] [address]  Via Fax: [phone]    Dear Leanne Perez MD,      Thank you for referring Ms. Lisa Lópze to [facility] for evaluation. My notes for this consultation are attached. If you have questions, please do not hesitate to call me. I look forward to following your patient along with you.       Sincerely,    Dora Vallejo NP

## 2022-03-28 NOTE — PATIENT INSTRUCTIONS
As per discussion    MRI has been ordered we will get that through [facility] we will need to get the prior authorization before that can be completed    I let you know the results of your blood work through [address] hopefully can get that [address] faxed if not let me know and we will mail them out to you    In the meantime talk with your PCP getting the shingles vaccine and going forward at some point you may want to consider start getting the flu vaccine    And as we talked about even though your antibody titers are low related to the Ocrevus use there are scientific studies that demonstrate that other immune boosting cells get elevated and the experts within the [place] [address] research community do support continuing to get the vaccines and boosters despite the antibody level        Office Policies    o Phone calls/patient messages:  Please allow up to 24 hours for someone in the office to contact you about your call or message. Be mindful your provider may be out of the office or your message may require further review. We encourage you to use [employer] for your messages as this is a faster, more efficient way to communicate with our office    o Medication Refills:  Prescription medications require up to 48 business hours to process. We encourage you to use [employer] for your refills. For controlled medications: Please allow up to 72 business hours to process. Certain medications may require you to  a written prescription at our office. NO narcotic/controlled medications will be prescribed after 4pm Monday through Friday or on weekends    o Form/Paperwork Completion:  We ask that you allow 7-14 business days. You may also download your forms to [employer] to have your doctor print off.

## 2022-03-28 NOTE — PROGRESS NOTES
Seth Sanchez is a 45 y.o. female  HIPAA verified by two patient identifiers. Health Maintenance Due   Topic    Hepatitis C Screening     DTaP/Tdap/Td series (1 - Tdap)    Cervical cancer screen     Shingrix Vaccine Age 50> (1 of 2)    Flu Vaccine (1)    COVID-19 Vaccine (3 - Booster for Lo Peter series)     Chief Complaint   Patient presents with    Follow-up     Visit Vitals  /70   Pulse 92   Temp 97.7 °F (36.5 °C)   Resp 18   Ht 5' 4\" (1.626 m)   Wt 153 lb (69.4 kg)   SpO2 95%   BMI 26.26 kg/m²       Pain Scale: 0 - No pain/10  Pain Location:   1. Have you been to the ER, urgent care clinic since your last visit? Hospitalized since your last visit? No    2. Have you seen or consulted any other health care providers outside of the [address] [place] [address] since your last visit? Include any pap smears or colon screening.  No

## 2022-04-05 ENCOUNTER — TELEPHONE (OUTPATIENT)
Dept: NEUROLOGY | Age: 52
End: 2022-04-05

## 2022-04-06 ENCOUNTER — TELEPHONE (OUTPATIENT)
Dept: NEUROLOGY | Age: 52
End: 2022-04-06

## 2022-04-06 NOTE — TELEPHONE ENCOUNTER
Rec'd call back from [address] [place] [address]. Faxed 17 pages to [phone]. Patient to call their scheduling dept at [phone]. I asked and they do not have an appt for her yet. I will let pt know through [employer] to give them a call later today or tomorrow once they can see the faxed documents.

## 2022-04-13 LAB
INDEX VALUE: 0.24
INTERPRETATION: NORMAL
INTERPRETATION: NORMAL
JCPYV AB SERPL QL IA: NORMAL
JCV AB BY INHIBITION: NEGATIVE

## 2022-04-14 NOTE — PROGRESS NOTES
Results of the tests were reviewed in MyChart with the patient as follows:JCV antibody test is 0.24 which is considered indeterminant to negativeWe will just continue to monitor this every 6 months to a year to make sure there is no change in status

## 2022-09-13 ENCOUNTER — TELEPHONE (OUTPATIENT)
Dept: NEUROLOGY | Age: 52
End: 2022-09-13

## 2022-09-13 NOTE — TELEPHONE ENCOUNTER
Voicemail:    Hellen Martinez w/ [employer] called stating they need Pt's recent office notes and supporting labs on how Pt is doing with the [address] [place] [address]. Stated that the authorization expires today.     P #: [id number]    F #: [phone]

## 2023-01-09 ENCOUNTER — VIRTUAL VISIT (OUTPATIENT)
Dept: NEUROLOGY | Age: 53
End: 2023-01-09
Payer: COMMERCIAL

## 2023-01-09 DIAGNOSIS — F51.04 PSYCHOPHYSIOLOGICAL INSOMNIA: ICD-10-CM

## 2023-01-09 DIAGNOSIS — E55.9 VITAMIN D DEFICIENCY: ICD-10-CM

## 2023-01-09 DIAGNOSIS — G35 MULTIPLE SCLEROSIS (HCC): Primary | ICD-10-CM

## 2023-01-09 DIAGNOSIS — G43.009 MIGRAINE WITHOUT AURA AND WITHOUT STATUS MIGRAINOSUS, NOT INTRACTABLE: ICD-10-CM

## 2023-01-09 DIAGNOSIS — Z79.899 LONG-TERM CURRENT USE OF HIGH RISK MEDICATION OTHER THAN ANTICOAGULANT: ICD-10-CM

## 2023-01-09 PROCEDURE — 99215 OFFICE O/P EST HI 40 MIN: CPT | Performed by: PSYCHIATRY & NEUROLOGY

## 2023-01-09 NOTE — ASSESSMENT & PLAN NOTE
Patient is to continue to get all of her vaccines as appropriate including her Shingrix vaccine  Continue with universal precautions    Patient remains on high risk immunosuppressive therapy of Ocrevus    Presently not due for blood work or neuroimaging at this time

## 2023-01-09 NOTE — ASSESSMENT & PLAN NOTE
Infrequent no preventative therapy necessary at this time continue to use Fioricet as needed as prescribed by PCP

## 2023-01-09 NOTE — ASSESSMENT & PLAN NOTE
Patient is at good therapeutic value at 87.6  I recommend using vitamin D 5000 units once a week to maintain levels target goal is to continue to have her level greater than 50

## 2023-01-09 NOTE — PATIENT INSTRUCTIONS
As per our discussion    Overall you are doing really well continue with the [address] MRI scan of the brain looks terrific. You have minimal lesion activity which is awesome and there is no worsening or new lesions    Blood work all looks terrific your vitamin D is up in the upper 80 range so we can probably switch you to vitamin D 5000 units once per week just as maintenance  My target would be to make sure you are 50 or above regarding your lab values    Check with your PCP about whether you should go on calcium  He may want to go on calcium plus vitamin D which is fine usually that is only 500 units so you still need to stay on your 5000 units weekly    I do agree with getting your shingles vaccine get the Shingrix vaccine  And just be advised that this may make you feel Marinell Brightly for a few days so I would recommend getting it closer to the end of the week and spending the weekend just relaxing everyone is different of course but in general most people I know usually do not feel well after the Shingrix and need a few days to recover    But we can see this with almost any vaccine    Otherwise continue activity as tolerated I do advise taking the month of January just to rest and restore      Office Policies      Appointments  Please make sure that you arrive for your next appointment at least 15 minutes prior to your appointment time. If for some reason you are going to be late please notify the office to determine if you need to be rescheduled or we can adjust your appointment time      Phone calls/patient messages:  Please allow up to 24 hours for someone in the office to contact you about your call or message. Be mindful your provider may be out of the office or your message may require further review.  We encourage you to use [email or web address] for your messages as this is a faster, more efficient way to communicate with our office    Medication Refills:  Prescription medications require up to 48 business hours to process. We encourage you to use [employer] for your refills. For controlled medications: Please allow up to 72 business hours to process. Certain medications may require you to  a written prescription at our office. NO narcotic/controlled medications will be prescribed after 4pm Monday through Friday or on weekends    Form/Paperwork Completion:  We ask that you allow 7-14 business days. You may also download your forms to [employer] to have your doctor print off.

## 2023-01-09 NOTE — ASSESSMENT & PLAN NOTE
Stable and doing well on Ocrevus tolerating well without side effects or significant problems or issues with recurring infections    Most recent radiographic imaging shows stability of disease process radiographically  And clinically she remains stable    She recently had blood work done by PCP she will get those uploaded to me subsequently I will not order additional blood work at this time    She will be due for repeat blood work in about 6 months at which point I will need a CBC with differential, CMP, vitamin D level [Target ranges to make sure vitamin D is greater than 50] and JCV with stratified antibody [not the PCR] as well as quantitative serum immunoglobulins

## 2023-01-09 NOTE — PROGRESS NOTES
[facility]  VV FOLLOW-UP VISIT     Usha Wills is a 47 y.o. female who was seen by synchronous (real-time) audio-video technology on 1/9/2023. Usha Wills is a 47 y.o. female who presents today for the following:  Chief Complaint   Patient presents with    Follow-up     Follow up on ms and has wanted to talk about mri results from April. Did not get message or see a message from [place] but also has not logged back on to my chart in a while. Please send text to [phone]     [phone] is correct txt for VV    ASSESSMENT AND PLAN    1. Multiple sclerosis ([address]  Assessment & Plan:  Stable and doing well on Ocrevus tolerating well without side effects or significant problems or issues with recurring infections    Most recent radiographic imaging shows stability of disease process radiographically  And clinically she remains stable    She recently had blood work done by PCP she will get those uploaded to me subsequently I will not order additional blood work at this time    She will be due for repeat blood work in about 6 months at which point I will need a CBC with differential, CMP, vitamin D level [Target ranges to make sure vitamin D is greater than 50] and JCV with stratified antibody  2. Migraine without aura and without status migrainosus, not intractable  Assessment & Plan:   Infrequent no preventative therapy necessary at this time continue to use Fioricet as needed as prescribed by PCP  3. Vitamin D deficiency  Assessment & Plan:   Patient is at good therapeutic value at 87.6  I recommend using vitamin D 5000 units once a week to maintain levels target goal is to continue to have her level greater than 50  4.  Long-term current use of high risk medication other than anticoagulant  Assessment & Plan:   Patient is to continue to get all of her vaccines as appropriate including her Shingrix vaccine  Continue with universal precautions    Patient remains on high risk immunosuppressive therapy of Ocrevus    Presently not due for blood work or neuroimaging at this time  5. Psychophysiological insomnia  Assessment & Plan:   Stable and continues on Ambien as needed      Patient and/or family was given time to ask questions and voice concerns. I believe all questions concerns were adequately addressed at this  office visit. Patient and/or family also verbalized agreement and understanding of the above-stated plan    Patient remains a complex patient secondary to polypharmacy, significant comorbid conditions, and use of high-risk medications which complicate the decision making process related to patient's neurologic diagnosis      ICD-10-CM ICD-9-CM    1. Multiple sclerosis ([facility] [address]  G35 340       2. Migraine without aura and without status migrainosus, not intractable  G43.009 346.10       3. Vitamin D deficiency  E55.9 268.9       4. Long-term current use of high risk medication other than anticoagulant  Z79.899 V58.69       5. Psychophysiological insomnia  F51.04 307.42           I attest that 40 minutes was spent on today's visit reviewing medical records and diagnostic testing deemed pertinent to this patient's care, along with direct time spent at patient's visit including the history, physical assessment and plan, discussing diagnosis and management along with documentation. HPI  Historical Data  Patient is known to me from my prior practice and is followed for a diagnosis of MS  She has relapsing remitting MS          Denies SE   On [address] [place] [address] Avenue: feels [place] fog\" gone; cognition more clear     Prior DMT  Copaxone 40mg 3x/week  Copaxone 20mg daily     7/10/17. JCV: negative. Hep B: non-reactive. Pt's blood work panel[CBC, CMP, VZV, JCV, Hep B] of 7/10/17 including Hep B was normal/non-reactive. Multiple sclerosis (MS) symptoms are stable. Since last visit, there has been nothing to suggest an MS relapse.   No evidence of infeciton     Denies: B/B, mood, Cognition, fatigue, sleep issues, HAs and Migraines, Numbness and paresthesia, weakness,      11/2018: ER for final dx of Gastroenteritis, near syncope and dehydration  [EMR reviewed OV 4/30/19]   WBC: 15.0  Ketones 20 in Urine  Abd/pelvic CT: inflammatory/ infectious enteritis  D/C home wo further event  Per pt: was being treated for rash w Minocin and per pt PCP states due to a reaction to Abx        6/2017  Copaxone injection reaction [see below]  Stress and Echo: WNL. Pt was seen 6/21/17 due to Copaxone injection reaction. Monday night pt injected as usual.   Had a bit of blood from injection site that happens from time to time per pt report. But it was a bit more than usual.   She remembers not feeling well and having a burning sensation in her chest and tachycardia [she had had it once before and Dx as IPIR] but this time she felt so poorly that she laid down and she got severe low back pain  abdominal cramping and her face felt hot turned red and began to swell. She never felt like her breathing was impaired or her throat was closing or could not breath. 911 was called. By the time she arrived she was feeling some better and did not go to the hospital.   She did call me, [as she has my personal phone number] and recounted the  above data. She was advised NOT to take the Copaxone again [and she has not]. She was advised to take Benadryl that night [which she did] and follow up during the day with OTC non-sedating histamine down regulator such as Claritin, Zyrtec etc.   She is taking Claritin daily. And she was advised if sx should worsen again or breathing feels compromised to go to the ER. And she was advised to see me in the office this week for evaluation and discussion of change in DMT [and she is here today to do so]. At f/u visit:  she feels worn out and that she was \"hit by a truck\". and still achy all over. She expresses concern about possible cardiac damage.   Stress test was odered  pt with significant reaction to Copaxone. I STILL  think it is more of an allergic reaction than simply a IPIR. Pt had facial swelling. She has had a IPIR prior to this when on the 20mg daily injection: that one was similar but not as severe and no facial flushing or swelling and did not take a week to recover. And I would not expect an IPIR to escalate. She hit a superficial vein with this last one so it is possible that may have caused more rapid absorption to magnify event but there is no way to know and I lean toward it not being a major player. There is NO WAY to monitor for superficial vein distribution so in-office monitoring would not be beneficial.            2011: + Lyme titer; worked up by Dr. Benjamin Barrett and felt to be a false + reading. 4/23/18  MRI Head w/wo contrast  Comparision: 5/19/17  Unchanged        5/19/17. MRI Head. NO new lesions identified. No acute changes. NO enhancing lesions. Repeat MRI Brain and C-spine  1/2016:. No new lesions, no enhancing lesions, no interval change :).         8/26/15. MRI BRain w/wo. no acute changes. no new lesions. 7/8/15. MRI Brain w and wo: .   2 new small foci identified. small pineal cyst: unchanged. no acute process. MRI C-spine w/wo.   multiple disc bulges. C5-6: mild LT and minimal Rt foramina stenosis. multiple meningeal cyst or diverticulum. Subtle T2 intensity RT lateral cervical C2 suspect consistent w mild demyelinating lesion. no acute process. MRI T-spine w/wo. no evidence of demyelinating process: acute or chronic.   scattered multilevel minimal or small root diverticula or perineural root cysts bilaterally. Medical Hx:  uterine ablation     Known allergies: codeine: nausea. heat rash on chest spring 2019; also had it in November 2018 and went on an Abx and had a reaction and went to ER;  Heart rate dropped and presyncope: reaction to minocycline         Other current medical issues historically: Migraines    May 4, 2021 to [facility] ED for anaphylactic reaction to flaxseed            Treated with Benadryl EpiPen and steroids            Was not admitted to the hospital and no further sequela            Discharged home on steroids and EpiPen      Other:  Covid 2021 Summer     Interim Data:     Regarding MS:    Ocrevus: Induction infusion  competed September 8 and 22, 2017  Infusion  #2 completed March 16th, 2018  Infusion #3 Sept 21 2018  Infusion #4 March 25, 2019  Infusion #5 September 20, 2019 [[facility] on [address]]  Infusion #6 March 25, 2020 (by a prescription on [address] [place] [address] in [place])  Infusion #7Sept 18, 9578  Infusion #8 March 19 2021  Infusion #9 September 2021  Infusion # 10 March 25 2022  Infusion #11 September 2022  Infusion #12 pending March 2023      Patient is doing well has no complaints and denies any focal signs or symptoms related to MS dysfunction. Specifically she denies any focal weakness or paresthesias, acute visual changes, hearing loss tinnitus, speech or swallowing problems, focal weakness paresthesias, bowel or bladder dysfunction, cognitive difficulties, chronic fatigue.      States stress is manageable and she continues with insomnia as needed and uses Ambien as needed     Headaches and migraines  Infrequent usually uses Fiorinal less than once a month to abort a headache       Additional pertinent data    Orders Only on 03/28/2022   Component Date Value Ref Range Status    Vitamin D 25-Hydroxy 03/28/2022 87.9  30 - 100 ng/mL Final    Sodium 03/28/2022 139  136 - 145 mmol/L Final    Potassium 03/28/2022 5.0  3.5 - 5.1 mmol/L Final    Chloride 03/28/2022 105  97 - 108 mmol/L Final    CO2 03/28/2022 29  21 - 32 mmol/L Final    Anion gap 03/28/2022 5  5 - 15 mmol/L Final    Glucose 03/28/2022 95  65 - 100 mg/dL Final    BUN 03/28/2022 15  6 - 20 MG/DL Final    Creatinine 03/28/2022 0.68  0.55 - 1.02 MG/DL Final    BUN/Creatinine ratio 03/28/2022 22 (A)  12 - 20   Final    GFR est AA 03/28/2022 >60  >60 ml/min/1.73m2 Final    GFR est non-AA 03/28/2022 >60  >60 ml/min/1.73m2 Final    Calcium 03/28/2022 9.5  8.5 - 10.1 MG/DL Final    Bilirubin, total 03/28/2022 0.4  0.2 - 1.0 MG/DL Final    ALT (SGPT) 03/28/2022 20  12 - 78 U/L Final    AST (SGOT) 03/28/2022 12 (A)  15 - 37 U/L Final    Alk. phosphatase 03/28/2022 91  45 - 117 U/L Final    Protein, total 03/28/2022 7.5  6.4 - 8.2 g/dL Final    Albumin 03/28/2022 4.3  3.5 - 5.0 g/dL Final    Globulin 03/28/2022 3.2  2.0 - 4.0 g/dL Final    A-G Ratio 03/28/2022 1.3  1.1 - 2.2   Final    WBC 03/28/2022 6.5  3.6 - 11.0 K/uL Final    RBC 03/28/2022 4.33  3.80 - 5.20 M/uL Final    HGB 03/28/2022 12.9  11.5 - 16.0 g/dL Final    HCT 03/28/2022 40.2  35.0 - 47.0 % Final    MCV 03/28/2022 92.8  80.0 - 99.0 FL Final    MCH 03/28/2022 29.8  26.0 - 34.0 PG Final    MCHC 03/28/2022 32.1  30.0 - 36.5 g/dL Final    RDW 03/28/2022 13.2  11.5 - 14.5 % Final    PLATELET 45/01/2305 316  150 - 400 K/uL Final    MPV 03/28/2022 11.7  8.9 - 12.9 FL Final    NRBC 03/28/2022 0.0  0  WBC Final    ABSOLUTE NRBC 03/28/2022 0.00  0.00 - 0.01 K/uL Final    NEUTROPHILS 03/28/2022 54  32 - 75 % Final    LYMPHOCYTES 03/28/2022 33  12 - 49 % Final    MONOCYTES 03/28/2022 7  5 - 13 % Final    EOSINOPHILS 03/28/2022 5  0 - 7 % Final    BASOPHILS 03/28/2022 1  0 - 1 % Final    IMMATURE GRANULOCYTES 03/28/2022 0  0.0 - 0.5 % Final    ABS. NEUTROPHILS 03/28/2022 3.5  1.8 - 8.0 K/UL Final    ABS. LYMPHOCYTES 03/28/2022 2.2  0.8 - 3.5 K/UL Final    ABS. MONOCYTES 03/28/2022 0.5  0.0 - 1.0 K/UL Final    ABS. EOSINOPHILS 03/28/2022 0.3  0.0 - 0.4 K/UL Final    ABS. BASOPHILS 03/28/2022 0.1  0.0 - 0.1 K/UL Final    ABS. IMM.  GRANS. 03/28/2022 0.0  0.00 - 0.04 K/UL Final    DF 03/28/2022 AUTOMATED    Final   Office Visit on 03/28/2022   Component Date Value Ref Range Status    INDEX VALUE 03/28/2022 0.24 Final    JCV Ab 03/28/2022 Comment   Final    Interpretation 03/28/2022 Comment   Final    JCV AB BY INHIBITION 03/28/2022 Negative   Final    INTERPRETATION 03/28/2022 Comment   Final        MRI scan of the brain with and without contrast completed at [facility] on April 22, 2022    FINDINGS:     BRAIN AND POSTERIOR FOSSA:  Subtle abnormal signal is again noted in the dorsal aspect of the nicolas (image 17, series 6). There is also a small focus of abnormal signal involving the juxtacortical white matter along the vertex of the left frontal lobe (image 45). There is also a small elliptical focus suggested in the white matter along the posterior aspect of the body of the right lateral ventricle (image 31, series 6). Scattered punctate additional foci are present with a nonspecific pattern and distribution and are also unchanged. The focus in the juxtacortical white matter of the left upper frontal lobe does demonstrate near fluid signal characteristics on the T1 and T2 sequences suggesting a focal area of axonal destruction with neuronal loss, given the history of multiple sclerosis. The remaining foci are isointense to the surrounding white matter on the T1 sequences. No new areas of abnormal parenchymal signal are identified and there are no enhancing foci to suggest active demyelination. The sulci, cerebellar fissures, ventricles and basal cisterns are within normal limits for the patient's age. There is no intracranial hemorrhage, mass effect, or midline shift. There are no significant additional areas of abnormal parenchymal signal. There are no areas of restricted diffusion to suggest acute infarct. There is no abnormal cerebral or cerebellar enhancement. EXTRA-AXIAL SPACES AND MENINGES: There are no abnormal extra-axial fluid collections. There is no abnormal meningeal enhancement.      VASCULAR: The visualized portions of the intracranial carotid and vertebrobasilar systems demonstrate patent appearing flow voids. CALVARIA: Unremarkable. SINUSES: Clear, as visualized. CRANIOCERVICAL JUNCTION: Within normal limits for age. OTHER: None.   _______________        Impression      1. Minimal burden of white matter disease with a few foci which are consistent with the patient's history of multiple sclerosis and additional foci with a nonspecific pattern and distribution. No definite new foci of abnormal white matter signal are present. . No enhancing foci are present to suggest active demyelination. 2.  No acute intracranial abnormalities are identified and there are no imaging findings to suggest a treatment related complication such as PML. Signed By: Merlyn Gallardo MD on 4/22/2022 5:02 PM    No results found for this or any previous visit. Allergies   Allergen Reactions    Flaxseed Anaphylaxis    Glatiramer (Copolymer 1) Swelling     Rapid heartbeat. Burning in chest.    Codeine Nausea and Vomiting     NAUSEA    Copaxone [Glatiramer] Other (comments)     Flushing, headache, palpitations    Minocycline Other (comments)     Light headed        Current Outpatient Medications   Medication Sig    zolpidem (AMBIEN) 5 mg tablet nightly as needed. EPINEPHrine (EPIPEN) 0.3 mg/0.3 mL injection 0.3 mg by IntraMUSCular route as needed. butalbital-aspirin-caffeine (FIORINAL) capsule Take 1 Tab by mouth every four (4) hours as needed. multivit-mins no.63/iron/folic (M-VIT PO) Take  by mouth daily. ocrelizumab (OCREVUS) 30 mg/mL soln injection 1 mL by IntraVENous route every 6 months. Indications: relapsing form of multiple sclerosis    cholecalciferol (VITAMIN D3) 2,000 unit cap capsule 5000 units by mouth once a day  Indications: Prevention of Vitamin D Deficiency    krill-om-3-dha-epa-phospho-ast (KRILL OIL) 1,327-597-63-80 mg cap Take 1 Cap by mouth daily. No current facility-administered medications for this visit.        Past medical history/surgical history, family history, and social history have been reviewed for today's visit      ROS    A ten system review of constitutional, cardiovascular, respiratory, musculoskeletal, endocrine, skin, SHEENT, genitourinary, psychiatric and neurologic systems was obtained and is unremarkable except as mentioned under HPI          EXAMINATION: Within context of a virtual visit    There were no vitals taken for this visit. General appearance: Patient is well-developed and well-nourished in no apparent distress and well groomed. Psych/mental health:  Affect: Appropriate    PHQ  3 most recent PHQ Screens 1/9/2023   Little interest or pleasure in doing things Not at all   Feeling down, depressed, irritable, or hopeless Not at all   Total Score PHQ 2 0       HEENT:   Normocephalic  With evidence of trauma: No  Full range of motion head neck: Yes  Tenderness to palpation of the head neck region: N/A      Cardiovascular:     Extremities warm to touch: Yes  Extremity swelling: No  Discoloration: No  Evidence of PVD: No    Respiratory:   Dyspnea on exertion: No   Abnormal effort on casual observation: No   Use of portable oxygen: No   Evidence of cyanosis: No     Musculoskeletal:   Evidence of significant bone deformities: No   Spinal curvature: No     Integumentary:    Obvious bruising: No   Lacerations or discoloration on casual observation: No       Neurological Examination:   Mental Status:        MMSE  No flowsheet data found. Formal testing was not completed    there was nothing concerning on general observation and discussion.    Alert oriented and appropriate to general conversation  Normal processing on general observation  Followed conversation and responded seemingly appropriate throughout the office visit  No word finding difficulties noted on casual observation  Able to follow directions without difficulty     Cranial Nerves:    Grossly intact    Motor:   Normal bulk  No tremor appreciated on today's exam  No abnormal movements appreciated on today's exam  Moves extremities spontaneously and with purpose      Sensation: Not tested today but on prior office visit: Intact to light touch    Coordination/Cerebellar:   Not tested today but on prior office visit: FTN: Intact bilaterally    Gait: Ambulates independently    Reflexes: Not tested today but on prior office visit: Symmetrical    Fall risk assessment  No flowsheet data found. Due to this being a TeleHealth evaluation, many elements of the physical examination are unable to be assessed. Pursuant to the emergency declaration under the [address] [place] [address], [facility] waiver authority and the [employer] Act, this Virtual  Visit was conducted, with patient's consent, to reduce the patient's risk of exposure to COVID-19 and provide continuity of care for an established patient. Services were provided through a video synchronous discussion virtually to substitute for in-person clinic visit. Follow-up and Dispositions    Return in about 6 months (around 7/9/2023) for In office appointment.              Robert Rico MS, ANP-BC, [facility]

## 2023-07-14 ENCOUNTER — TELEPHONE (OUTPATIENT)
Age: 53
End: 2023-07-14

## 2023-07-14 NOTE — TELEPHONE ENCOUNTER
Confirmed patient id and advised that the visit is fine to be virtual  advised that she will get a call from  psr to check in and call from nurse to do nurse part of the visit and then link will be sent by provider.

## 2023-07-17 ENCOUNTER — TELEMEDICINE (OUTPATIENT)
Age: 53
End: 2023-07-17
Payer: COMMERCIAL

## 2023-07-17 DIAGNOSIS — G43.009 MIGRAINE WITHOUT AURA AND WITHOUT STATUS MIGRAINOSUS, NOT INTRACTABLE: ICD-10-CM

## 2023-07-17 DIAGNOSIS — E55.9 VITAMIN D DEFICIENCY: ICD-10-CM

## 2023-07-17 DIAGNOSIS — G35 MULTIPLE SCLEROSIS (HCC): Primary | ICD-10-CM

## 2023-07-17 DIAGNOSIS — Z79.899 LONG-TERM CURRENT USE OF HIGH RISK MEDICATION OTHER THAN ANTICOAGULANT: ICD-10-CM

## 2023-07-17 DIAGNOSIS — F51.04 PSYCHOPHYSIOLOGICAL INSOMNIA: ICD-10-CM

## 2023-07-17 PROCEDURE — 99215 OFFICE O/P EST HI 40 MIN: CPT | Performed by: PSYCHIATRY & NEUROLOGY

## 2023-07-17 ASSESSMENT — PATIENT HEALTH QUESTIONNAIRE - PHQ9
SUM OF ALL RESPONSES TO PHQ9 QUESTIONS 1 & 2: 0
2. FEELING DOWN, DEPRESSED OR HOPELESS: 0
SUM OF ALL RESPONSES TO PHQ QUESTIONS 1-9: 0
1. LITTLE INTEREST OR PLEASURE IN DOING THINGS: 0
SUM OF ALL RESPONSES TO PHQ QUESTIONS 1-9: 0

## 2023-07-17 NOTE — ASSESSMENT & PLAN NOTE
Patient takes over-the-counter vitamin D several times per week as her most recent level was in the 70s    Would recommend PCP check that level at next office visit when he does blood work  Target range is to keep the level at 50 or above

## 2023-07-17 NOTE — ASSESSMENT & PLAN NOTE
Patient remains on high risk medication due to chronic immunosuppression  Seemingly tolerating it well  I have asked her to upload most recent lab work from PCP  Will be due for MRI scans in 2024 sooner if necessary

## 2023-07-17 NOTE — ASSESSMENT & PLAN NOTE
Continues to remain quite stable regarding her clinical status with MS  Most recent radiographic studies are also stable and shows mild disease burden  She is not due for repeat scans until 2024  Blood work is getting done through primary care and I do not have access to those labs so I have requested the patient to upload those to [address] [place] [address] and I will let her know if there is anything additional we need to do based on those results    She is to continue on Layla Kristan presently she is next due in September 2024 for infusion #13  She is tolerating this well without side effects or recurrent infections

## 2023-07-17 NOTE — PATIENT INSTRUCTIONS
As per discussion overall you are doing well    If you could just upload your latest blood work from your primary care that would be helpful and I will reach out to you regarding anything else we might need to do based on those results    You will be due for your MRI scans again in 2024 we will get those scheduled after your first office visit in 2024  I would asked that you could get them done at [address] [place] [address] so I am able to see the reports more easily and I can also look at the images if needed    Happy early birthday! I forgot to mention that at our visit    And I hope you have a great summer!

## 2023-07-21 ENCOUNTER — CLINICAL DOCUMENTATION (OUTPATIENT)
Age: 53
End: 2023-07-21

## 2023-07-21 ENCOUNTER — PATIENT MESSAGE (OUTPATIENT)
Age: 53
End: 2023-07-21

## 2023-07-21 DIAGNOSIS — G35 MULTIPLE SCLEROSIS (HCC): Primary | ICD-10-CM

## 2023-07-21 NOTE — PROGRESS NOTES
Received lab work from patient's PCP to include Chem-12 lipid thyroid vitamin D sedimentation rate PTH all of which are unremarkable from a neurologic standpoint.   Vitamin D was 90.7    Still requiring CBC with differential I will send out a lab order for the patient to get done at her next lab draw

## 2023-08-16 ENCOUNTER — TELEPHONE (OUTPATIENT)
Age: 53
End: 2023-08-16

## 2023-08-16 NOTE — TELEPHONE ENCOUNTER
Sho donis/ [facility] called stating that they had requested recent office visit notes from us but received OV notes from over 3 years ago from 2020. Requested we please fax over most recent OV notes.      Fax #:  [phone]

## 2023-08-18 NOTE — TELEPHONE ENCOUNTER
Usama Espitia /gag[facility]ep [facility] called again stating that he once again received OV from 3/23/20. Requested that Pt's OV notes from VV on 7/17/23 please be faxed to him so he can have the most up to date info.      Fax #:  [phone]

## 2023-08-23 ENCOUNTER — TELEPHONE (OUTPATIENT)
Age: 53
End: 2023-08-23

## 2023-08-23 NOTE — TELEPHONE ENCOUNTER
Faxed VV from 7- to [facility] with [facility], fax # [phone]. Confirmation received. Archana Calix 5 days ago     AV  [facility] /[facility] called again stating that he once again received OV from 3/23/20. Requested that Pt's OV notes from VV on 7/17/23 please be faxed to him so he can have the most up to date info.       Fax #:  [phone]

## 2024-01-17 ENCOUNTER — TELEMEDICINE (OUTPATIENT)
Age: 54
End: 2024-01-17
Payer: COMMERCIAL

## 2024-01-17 DIAGNOSIS — M81.0 AGE-RELATED OSTEOPOROSIS WITHOUT CURRENT PATHOLOGICAL FRACTURE: ICD-10-CM

## 2024-01-17 DIAGNOSIS — Z79.899 LONG-TERM CURRENT USE OF HIGH RISK MEDICATION OTHER THAN ANTICOAGULANT: ICD-10-CM

## 2024-01-17 DIAGNOSIS — G43.009 MIGRAINE WITHOUT AURA AND WITHOUT STATUS MIGRAINOSUS, NOT INTRACTABLE: ICD-10-CM

## 2024-01-17 DIAGNOSIS — N95.1 MENOPAUSAL SYMPTOMS: ICD-10-CM

## 2024-01-17 DIAGNOSIS — G35 MULTIPLE SCLEROSIS (HCC): Primary | ICD-10-CM

## 2024-01-17 PROCEDURE — 99214 OFFICE O/P EST MOD 30 MIN: CPT | Performed by: PSYCHIATRY & NEUROLOGY

## 2024-01-17 RX ORDER — PHENOL 1.4 %
1 AEROSOL, SPRAY (ML) MUCOUS MEMBRANE 2 TIMES DAILY PRN
COMMUNITY

## 2024-01-17 ASSESSMENT — PATIENT HEALTH QUESTIONNAIRE - PHQ9
SUM OF ALL RESPONSES TO PHQ QUESTIONS 1-9: 0
SUM OF ALL RESPONSES TO PHQ QUESTIONS 1-9: 0
SUM OF ALL RESPONSES TO PHQ9 QUESTIONS 1 & 2: 0
2. FEELING DOWN, DEPRESSED OR HOPELESS: 0
SUM OF ALL RESPONSES TO PHQ QUESTIONS 1-9: 0
SUM OF ALL RESPONSES TO PHQ QUESTIONS 1-9: 0
1. LITTLE INTEREST OR PLEASURE IN DOING THINGS: 0

## 2024-01-17 NOTE — ASSESSMENT & PLAN NOTE
Continues to remain quite stable regarding her clinical status with MS  Most recent radiographic studies are also stable and shows mild disease burden  She is not due for repeat scans and these have been ordered  Blood work is getting done through primary care and I do not have access to those labs so I have requested the patient to upload those to [employer] and I will let her know if there is anything additional we need to do based on those results: Patient has been uploading these labs and to date lab work is stable    Patient is to continue on Ocrevus she is due for her next infusion toward the end of March 2024  She is tolerating this well without side effects or recurrent infections

## 2024-01-17 NOTE — ASSESSMENT & PLAN NOTE
Patient remains on high risk medication due to chronic immunosuppression  Seemingly tolerating it well  Patient routinely uploads her blood work completed by primary care and to date lab work is stable  Will be due for MRI scans are due for surveillance and these have been ordered.  Patient gets them through [employer] system at the [address] [place] [address] in [place]

## 2024-01-17 NOTE — PROGRESS NOTES
distribution.  No definite new foci of abnormal white matter signal are present.. No enhancing foci are present to suggest active demyelination.     2.  No acute intracranial abnormalities are identified and there are no imaging findings to suggest a treatment related complication such as PML.     Signed By: Last Romeo MD on 4/22/2022 5:02 PM    No results found for this or any previous visit.    Allergies   Allergen Reactions    Flaxseed Anaphylaxis    Glatiramer Other (See Comments) and Swelling     Flushing, headache, palpitations  Rapid heartbeat. Burning in chest.      Minocycline Other (See Comments)     Light headed     Codeine Nausea And Vomiting     NAUSEA     Current Outpatient Medications   Medication Sig Dispense Refill    calcium carbonate 600 MG TABS tablet Take 1 tablet by mouth 2 times daily as needed      KRILL OIL PO Take 1 capsule by mouth daily      Multiple Vitamins-Minerals (CENTRUM SILVER 50+WOMEN PO) Take by mouth daily      butalbital-aspirin-caffeine (FIORINAL) -40 MG capsule Take 1 tablet by mouth every 4 hours as needed.      Cholecalciferol 50 MCG (2000 UT) CAPS 5000 units by mouth once a day  Indications: Prevention of Vitamin D Deficiency      EPINEPHrine (EPIPEN) 0.3 MG/0.3ML SOAJ injection Inject 0.3 mLs into the muscle as needed      ocrelizumab (OCREVUS) 300 MG/10ML SOLN injection Infuse 1 mL intravenously      zolpidem (AMBIEN) 5 MG tablet nightly as needed.       No current facility-administered medications for this visit.           Past medical history/surgical history, family history, and social history have been reviewed for today's visit      ROS    A ten system review of constitutional, cardiovascular, respiratory, musculoskeletal, endocrine, skin, SHEENT, genitourinary, psychiatric and neurologic systems was obtained and is unremarkable except as mentioned under HPI          EXAMINATION: Within context of a virtual visit    There were no vitals taken for this

## 2024-01-17 NOTE — ASSESSMENT & PLAN NOTE
Discussed with patient estrogen replacement as it relates to MS and MS intervention.    There is no contraindication to the use of estrogen related to MS and drug modifying therapy but there is also no strong indication to use estrogen replacement related to MS and drug modifying therapy.  It rarely will be a personal decision and urged patient to further discuss this with her GYN.

## 2024-01-17 NOTE — ASSESSMENT & PLAN NOTE
Patient is now being seen by rheumatology.  Patient had questions regarding osteoporosis treatment which I felt would be better directed to discuss with rheumatology.  But there is no major contraindication to medication treatment for osteoporosis as a relates to MS

## 2024-01-25 ENCOUNTER — TELEPHONE (OUTPATIENT)
Age: 54
End: 2024-01-25

## 2024-01-25 NOTE — TELEPHONE ENCOUNTER
Mri's (3)     Requested to be performed at [address]    Called [phone] - they obtain the P.A.'s faxed order, ins card and office visit notes to [phone].

## 2024-03-06 NOTE — Clinical Note
3/23/20 Patient: Lynne Fontenot YOB: 1970 Date of Visit: 3/23/2020 Ashley Daniels MD 
VIA Dear Ashley Daniels MD, Thank you for referring Ms. Janae Nevarez to [address] [place] [address] for evaluation. My notes for this consultation are attached. If you have questions, please do not hesitate to call me. I look forward to following your patient along with you. Sincerely, Eda Dakins, NP 
 
 Writer left message to call clinic for a message from Dr. Huynh.  Please relay lab result message.

## 2024-03-29 ENCOUNTER — TELEPHONE (OUTPATIENT)
Age: 54
End: 2024-03-29

## 2024-03-29 NOTE — TELEPHONE ENCOUNTER
Anderson states the insurance needs info from the doctor so they denied the request for ocrevus. They need prior authorization. She is scheduled for April 2nd for her next infusion. Thank you.     Fax Number: [phone].

## 2024-03-29 NOTE — TELEPHONE ENCOUNTER
Anderson donis/ [facility] - called her back  [phone]. No one picked up.      Will try back later today.

## 2024-03-29 NOTE — TELEPHONE ENCOUNTER
Re: Ocrevus lashell     Called Anderson at [employer] [employer], was transferred to her line, left detailed  w/ my dd number [phone].     Left message that provider is out of the office until Tuesday.     Sent email to Deb Cartagena at [facility] today:     Hejosiah Boyd!    We received a call re:  Laverne Tomlin  [birth year] that her Ocrevus is denied.  I have returned a call to : Anderson donis/ [facility], (ph [phone])  states the insurance needs info from the doctor so they denied the request for Ocrevus. They need prior authorization. She is scheduled for April 2nd for her next infusion.    Fax Number: [phone].     Do you have a copy of the denial and can you see what was sent to get the approval?  It is a continuation of treatment.      Edilia Streeter NP had a telemedicine visit on 1/17/24 and stated Ocrevus is to be continued and next infusion is late March.        forward to Edilia LEON

## 2024-04-01 ENCOUNTER — TELEPHONE (OUTPATIENT)
Age: 54
End: 2024-04-01

## 2024-04-01 NOTE — TELEPHONE ENCOUNTER
Received fax from [employer] for patient ocrevus requesting last infusion date and next scheduled infusion date.      Spoke with patient and confirmed id.  Patient states that the last infusion was 9/28/2023 and next will be tomorrow 4/2/2024    Patient was suppose to have last week but due to not having the pa for the infusion patient did not receive and had to reschedule.     Faxed information to [employer].

## 2024-04-02 ENCOUNTER — TELEPHONE (OUTPATIENT)
Age: 54
End: 2024-04-02

## 2024-04-02 DIAGNOSIS — G35 MULTIPLE SCLEROSIS (HCC): Primary | ICD-10-CM

## 2024-04-02 NOTE — TELEPHONE ENCOUNTER
Attempted to connect for a peer to peer.  Was on the phone providing information on insurance id number patient information and spoke with Iraj who continued for over 30 min identifying the patient and then said that the code was needed from the denial letter. No such code is on the letter.  Advised that then wanted procedure code and we had to end the call with no resolve to the situation.

## 2024-04-02 NOTE — TELEPHONE ENCOUNTER
I need the full Rx updated in current meds so that I can answer any questions about dosage, quantity, how often, etc.      Doing these appeals for infusions are new to me and I want to be sure to get all details covered when I call about the denial.     Last written showing in Epic 4/10/19 and same date in [facility].     This is what I see: The Rx shows at the top 300 mg/ 10 ml. Directions show 1 ml IV.   I do not know how to interpret that.      ocrelizumab (OCREVUS) 300 MG/10ML SOLN injection [[id number]]    Order Details  Dose: 30 mg Route: IntraVENous Frequency: --   Dispense Quantity: -- Refills: --          Sig: Infuse 1 mL intravenously         Start Date: 04/10/19 End Date: --   Written Date: -- Expiration Date: --   Ordering Date: 03/31/23     Source: Received from: [facility]

## 2024-04-02 NOTE — TELEPHONE ENCOUNTER
Confirmed patient id and advised of the situation with the insurance and denial that was done and scanned into patient's chart this morning.  Patient understands that we are doing everything and that the prior auth specialist is working on getting this taken care of.

## 2024-04-02 NOTE — TELEPHONE ENCOUNTER
Ocrevus denial letter received from [facility], juan by \"Yoselin Murrell, Auth Specialist\"    Her ph is [phone], Fax [phone]    However, letter states to conduct a peer to peer to contact [employer] as soon as possible at [phone]. Letter outlines details regarding walking requirements.     Letter uploaded to Media and forwarded to Edilia for review.

## 2024-04-02 NOTE — TELEPHONE ENCOUNTER
Anuja with [facility] will ask if her prior auth team can find the denial code and she will follow up with us further.

## 2024-04-03 ENCOUNTER — TELEPHONE (OUTPATIENT)
Age: 54
End: 2024-04-03

## 2024-04-03 NOTE — TELEPHONE ENCOUNTER
Called [employer], s/w Yoselin Murrell, Auth Specialist     She sent original copy of denial letter and asked that we ref case [id number]    Faxed through [employer] Fax to [phone]    Rx updated in Epic.     Fax was transmitted successfully.

## 2024-04-03 NOTE — TELEPHONE ENCOUNTER
Re:  Harmony lobo  I called Yoselin Murrell, Authorization Specialist w/ [facility] at [phone] whose name was on the letter that we have in Media.     I told her a peer to peer attempt was made but unsuccessful.     Therefore, Edilia has written a letter of medical necessity.     Yoselin did not realize we did not receive a copy of the full denial letter - only her letter. She apologized for that and will fax that to me now.     Once received, she said to fax to Robyn at [phone] and reference Case # [id number].  (This is a Buy and Bill).     She confirmed patient gets the infusion at [facility]. They have a facility in [place] (pt lives in [facility]).

## 2024-04-08 ENCOUNTER — TELEPHONE (OUTPATIENT)
Age: 54
End: 2024-04-08

## 2024-04-08 DIAGNOSIS — G35 MULTIPLE SCLEROSIS (HCC): Primary | ICD-10-CM

## 2024-04-08 NOTE — TELEPHONE ENCOUNTER
Lisandro donis/ [facility] called stating that they need us to send over a new MRI brain order for orbits.     Ph #:  [phone]    Fax #:  [phone]

## 2024-04-11 ENCOUNTER — PATIENT MESSAGE (OUTPATIENT)
Age: 54
End: 2024-04-11

## 2024-04-12 ENCOUNTER — TELEPHONE (OUTPATIENT)
Age: 54
End: 2024-04-12

## 2024-04-12 NOTE — TELEPHONE ENCOUNTER
From: Laverne Tomlin  To: Edilia Streeter  Sent: 4/11/2024 2:09 PM EDT  Subject: Brain MRI Orders    Can you please fax the brain MRI orders to the [facility] at [address] [place] [address] or send them to me so I can print out?  [phone]    Thanks,Laverne

## 2024-04-12 NOTE — TELEPHONE ENCOUNTER
Called [facility] and inquired if the patient has approval for the infusion of ocrevus.      Dora gave the approved code of  approve for 2 visits  from 3/20/2024- 3/19/2025  Approved code  approved for 2 visits with the same approval dates.   Patient to have infusion at [facility].

## 2024-05-14 ENCOUNTER — TELEPHONE (OUTPATIENT)
Age: 54
End: 2024-05-14

## 2024-05-14 NOTE — TELEPHONE ENCOUNTER
Lvm for patient that we need next infusion date for ocrevus access solutions. Asked that she send my chart message or call with that date.

## 2024-07-17 ENCOUNTER — PATIENT MESSAGE (OUTPATIENT)
Age: 54
End: 2024-07-17

## 2024-07-17 NOTE — TELEPHONE ENCOUNTER
From: Laverne Tomlin  To: Edilia Streeter  Sent: 7/17/2024 12:53 PM EDT  Subject: ID and Insurance    I received a phone call asking for this information. See attached.

## 2024-07-18 ENCOUNTER — TELEMEDICINE (OUTPATIENT)
Age: 54
End: 2024-07-18
Payer: COMMERCIAL

## 2024-07-18 DIAGNOSIS — G35 MULTIPLE SCLEROSIS (HCC): Primary | ICD-10-CM

## 2024-07-18 DIAGNOSIS — G43.009 MIGRAINE WITHOUT AURA AND WITHOUT STATUS MIGRAINOSUS, NOT INTRACTABLE: ICD-10-CM

## 2024-07-18 DIAGNOSIS — E55.9 VITAMIN D DEFICIENCY: ICD-10-CM

## 2024-07-18 DIAGNOSIS — Z11.59 ENCOUNTER FOR SCREENING FOR OTHER VIRAL DISEASES: ICD-10-CM

## 2024-07-18 DIAGNOSIS — G35 MULTIPLE SCLEROSIS (HCC): ICD-10-CM

## 2024-07-18 DIAGNOSIS — F51.04 PSYCHOPHYSIOLOGICAL INSOMNIA: ICD-10-CM

## 2024-07-18 DIAGNOSIS — M81.0 AGE-RELATED OSTEOPOROSIS WITHOUT CURRENT PATHOLOGICAL FRACTURE: ICD-10-CM

## 2024-07-18 PROCEDURE — 99214 OFFICE O/P EST MOD 30 MIN: CPT | Performed by: PSYCHIATRY & NEUROLOGY

## 2024-07-18 RX ORDER — HYDROCHLOROTHIAZIDE 12.5 MG/1
TABLET ORAL
COMMUNITY

## 2024-07-18 ASSESSMENT — PATIENT HEALTH QUESTIONNAIRE - PHQ9
SUM OF ALL RESPONSES TO PHQ QUESTIONS 1-9: 0
1. LITTLE INTEREST OR PLEASURE IN DOING THINGS: NOT AT ALL
SUM OF ALL RESPONSES TO PHQ QUESTIONS 1-9: 0
SUM OF ALL RESPONSES TO PHQ QUESTIONS 1-9: 0
SUM OF ALL RESPONSES TO PHQ9 QUESTIONS 1 & 2: 0
2. FEELING DOWN, DEPRESSED OR HOPELESS: NOT AT ALL
SUM OF ALL RESPONSES TO PHQ QUESTIONS 1-9: 0

## 2024-07-18 ASSESSMENT — ANXIETY QUESTIONNAIRES
1. FEELING NERVOUS, ANXIOUS, OR ON EDGE: NOT AT ALL
2. NOT BEING ABLE TO STOP OR CONTROL WORRYING: NOT AT ALL
6. BECOMING EASILY ANNOYED OR IRRITABLE: NOT AT ALL
7. FEELING AFRAID AS IF SOMETHING AWFUL MIGHT HAPPEN: NOT AT ALL
4. TROUBLE RELAXING: NOT AT ALL
GAD7 TOTAL SCORE: 0
5. BEING SO RESTLESS THAT IT IS HARD TO SIT STILL: NOT AT ALL
3. WORRYING TOO MUCH ABOUT DIFFERENT THINGS: NOT AT ALL

## 2024-07-18 NOTE — ASSESSMENT & PLAN NOTE
Remains stable from a clinical as well as the radiographic perspective.  Continue on Ocrevus every 6 month infusions  She is tolerating the Ocrevus well without any reported side effects or problems with recurrent infections  She is due for blood work today that has been ordered  She does not need MRI scans at this time

## 2024-07-18 NOTE — ASSESSMENT & PLAN NOTE
Improving at this time per patient report.  Continue with current therapy of HCTZ under the direction  of rheumatology

## 2024-07-18 NOTE — PATIENT INSTRUCTIONS
Policies    Phone calls/patient messages:  Please allow up to 72 hours  for someone in the office to contact you about your call or message. Be mindful your provider may be out of the office or your message may require further review. We encourage you to use [employer] for your messages as this is a faster, more efficient way to communicate with our office    Medication Refills:  Prescription medications require up to 48 business hours to process. We encourage you to use [employer] for your refills.     For controlled medications: Please allow up to 72 business hours to process. Certain medications may require you to  a written prescription at our office.    NO narcotic/controlled medications will be prescribed after 4pm Monday through Friday or on weekends    Form/Paperwork Completion:  We ask that you allow 7-14 business days.  Forms can be downloaded to [employer] or you can have them faxed, mailed, or you can bring them in to our office directly.

## 2024-07-18 NOTE — PROGRESS NOTES
[facility]  [facility]  [address]  [place] [address]  Phone: [phone] fax: [phone]      Laverne Tomlin, was evaluated through a synchronous (real-time) audio-video encounter. The patient (or guardian if applicable) is aware that this is a billable service, which includes applicable co-pays. This Virtual Visit was conducted with patient's (and/or legal guardian's) consent. Patient identification was verified, and a caregiver was present when appropriate.   The patient was located at Other: at work in [place]  Provider was located at Facility (Appt Dept): [address]  [place] [address]  Confirm you are appropriately licensed, registered, or certified to deliver care in the state where the patient is located as indicated above. If you are not or unsure, please re-schedule the visit: Yes, I confirm.     Laverne Tomlin (:  1970) is a Established patient, presenting virtually for evaluation of the following:   Chief Complaint   Patient presents with    Follow-up     MS    Results     MRI results         Assessment & Plan     Below is the assessment and plan developed based on review of pertinent history, physical exam, labs, studies, and medications.  1. Multiple sclerosis (HCC)  Assessment & Plan:  Remains stable from a clinical as well as the radiographic perspective.  Continue on Ocrevus every 6 month infusions  She is tolerating the Ocrevus well without any reported side effects or problems with recurrent infections  She is due for blood work today that has been ordered  She does not need MRI scans at this time   Orders:  -     Comprehensive Metabolic Panel; Future  -     CBC with Auto Differential; Future  -     Neurofilament Light Chain, Serum; Future  2. Migraine without aura and without status migrainosus, not intractable  Assessment & Plan:   Very infrequent not requiring preventative therapy uses

## 2024-10-24 ENCOUNTER — TELEMEDICINE (OUTPATIENT)
Age: 54
End: 2024-10-24
Payer: COMMERCIAL

## 2024-10-24 DIAGNOSIS — Z79.899 LONG-TERM CURRENT USE OF HIGH RISK MEDICATION OTHER THAN ANTICOAGULANT: ICD-10-CM

## 2024-10-24 DIAGNOSIS — G35 MULTIPLE SCLEROSIS (HCC): Primary | ICD-10-CM

## 2024-10-24 DIAGNOSIS — Z87.01 HISTORY OF PNEUMONIA: ICD-10-CM

## 2024-10-24 PROBLEM — J01.10 ACUTE NON-RECURRENT FRONTAL SINUSITIS: Status: RESOLVED | Noted: 2019-04-10 | Resolved: 2024-10-24

## 2024-10-24 PROCEDURE — 99215 OFFICE O/P EST HI 40 MIN: CPT | Performed by: PSYCHIATRY & NEUROLOGY

## 2024-10-24 ASSESSMENT — PATIENT HEALTH QUESTIONNAIRE - PHQ9
SUM OF ALL RESPONSES TO PHQ QUESTIONS 1-9: 0
SUM OF ALL RESPONSES TO PHQ QUESTIONS 1-9: 0
SUM OF ALL RESPONSES TO PHQ9 QUESTIONS 1 & 2: 0
1. LITTLE INTEREST OR PLEASURE IN DOING THINGS: NOT AT ALL
SUM OF ALL RESPONSES TO PHQ QUESTIONS 1-9: 0
2. FEELING DOWN, DEPRESSED OR HOPELESS: NOT AT ALL
SUM OF ALL RESPONSES TO PHQ QUESTIONS 1-9: 0

## 2024-10-24 NOTE — PROGRESS NOTES
[facility]  [facility]  [address]  [place] [address]  Phone: [phone] fax: [phone]      Laverne Tomlin, was evaluated through a synchronous (real-time) audio-video encounter. The patient (or guardian if applicable) is aware that this is a billable service, which includes applicable co-pays. This Virtual Visit was conducted with patient's (and/or legal guardian's) consent. Patient identification was verified, and a caregiver was present when appropriate.   The patient was located at Home: [address]  [facility] [address]  Provider was located at Facility (Appt Dept): [address]  [place] [address]  Confirm you are appropriately licensed, registered, or certified to deliver care in the state where the patient is located as indicated above. If you are not or unsure, please re-schedule the visit: Yes, I confirm.     Laverne Tomlin (:  1970) is a Established patient, presenting virtually for evaluation of the following:   Chief Complaint   Patient presents with    Follow-up     Follow up on ms and had pneumonia and is suppose to have infusion and chano asked for vv         Assessment & Plan    1. Multiple sclerosis (HCC)  Assessment & Plan:  Presently remains clinically stable as well as stable from a radiographic perspective.     Due to her recent pneumonia and hospitalization we are going to postpone the Ocrevus till mid December to give her adequate time to recover.    We discussed in detail the long-term implications of being on chronic immunosuppressive therapy and risk for infection.     I have emphasized the need to maintain vigilance with universal precautions especially as we move further into cold and flu season.   I have strongly encouraged her to get her flu vaccine and discussed with her PCP whether she needs a fourth COVID-vaccine [which I would advocate for] and also get the first

## 2024-10-24 NOTE — PATIENT INSTRUCTIONS
Completion:  We ask that you allow 7-14 business days.  Forms can be downloaded to [employer] or you can have them faxed, mailed, or you can bring them in to our office directly.

## 2024-10-24 NOTE — ASSESSMENT & PLAN NOTE
Presently remains clinically stable as well as stable from a radiographic perspective.     Due to her recent pneumonia and hospitalization we are going to postpone the Ocrevus till mid December to give her adequate time to recover.    We discussed in detail the long-term implications of being on chronic immunosuppressive therapy and risk for infection.     I have emphasized the need to maintain vigilance with universal precautions especially as we move further into cold and flu season.   I have strongly encouraged her to get her flu vaccine and discussed with her PCP whether she needs a fourth COVID-vaccine [which I would advocate for] and also get the first of her Shingrix vaccines and also consider the possibility of getting the pneumonia vaccine.   She is a bit young for the pneumonia vaccine however given the chronic immunosuppressive and recent hospitalization I think this might be something that we need to do sooner rather than later if insurance allows it.     I would like her to get her vaccines at the end of November to give her adequate time to develop a more robust B-cell response before restarting the B cells depletion related to DMT therapy.     We do know that we can get some T-cell response with vaccinations as well despite reduced antibody production when on active B-cell depletion but since we are delaying the Ocrevus her B cells should be adequately repleted to be able to get up robust B-cell response     We will continue Ocrevus every 6 months but if we continue to have problems with recurrent infections we may need to rethink this therapy or at least interrupted and put her on a stepdown therapy to give her immune system some time to recover.    If we do this I would recommend Zeposia which works a little bit differently than her B-cell deplete her's but still has good efficacy

## 2025-02-06 LAB
ALBUMIN SERPL-MCNC: 4.5 G/DL (ref 3.8–4.9)
ALP SERPL-CCNC: 97 IU/L (ref 44–121)
ALT SERPL-CCNC: 13 IU/L (ref 0–32)
AST SERPL-CCNC: 18 IU/L (ref 0–40)
BASOPHILS # BLD AUTO: 0.1 X10E3/UL (ref 0–0.2)
BASOPHILS NFR BLD AUTO: 1 %
BILIRUB SERPL-MCNC: 0.3 MG/DL (ref 0–1.2)
BUN SERPL-MCNC: 18 MG/DL (ref 6–24)
BUN/CREAT SERPL: 27 (ref 9–23)
CALCIUM SERPL-MCNC: 9.7 MG/DL (ref 8.7–10.2)
CHLORIDE SERPL-SCNC: 105 MMOL/L (ref 96–106)
CO2 SERPL-SCNC: 24 MMOL/L (ref 20–29)
CREAT SERPL-MCNC: 0.67 MG/DL (ref 0.57–1)
EGFRCR SERPLBLD CKD-EPI 2021: 103 ML/MIN/1.73
EOSINOPHIL # BLD AUTO: 0.2 X10E3/UL (ref 0–0.4)
EOSINOPHIL NFR BLD AUTO: 3 %
ERYTHROCYTE [DISTWIDTH] IN BLOOD BY AUTOMATED COUNT: 11.9 % (ref 11.7–15.4)
GLOBULIN SER CALC-MCNC: 2.6 G/DL (ref 1.5–4.5)
GLUCOSE SERPL-MCNC: 88 MG/DL (ref 70–99)
HCT VFR BLD AUTO: 43.3 % (ref 34–46.6)
HGB BLD-MCNC: 14.3 G/DL (ref 11.1–15.9)
IMM GRANULOCYTES # BLD AUTO: 0 X10E3/UL (ref 0–0.1)
IMM GRANULOCYTES NFR BLD AUTO: 0 %
LYMPHOCYTES # BLD AUTO: 2.9 X10E3/UL (ref 0.7–3.1)
LYMPHOCYTES NFR BLD AUTO: 45 %
MCH RBC QN AUTO: 30 PG (ref 26.6–33)
MCHC RBC AUTO-ENTMCNC: 33 G/DL (ref 31.5–35.7)
MCV RBC AUTO: 91 FL (ref 79–97)
MONOCYTES # BLD AUTO: 0.4 X10E3/UL (ref 0.1–0.9)
MONOCYTES NFR BLD AUTO: 7 %
NEUTROPHILS # BLD AUTO: 2.8 X10E3/UL (ref 1.4–7)
NEUTROPHILS NFR BLD AUTO: 44 %
PLATELET # BLD AUTO: 292 X10E3/UL (ref 150–450)
POTASSIUM SERPL-SCNC: 4.6 MMOL/L (ref 3.5–5.2)
PROT SERPL-MCNC: 7.1 G/DL (ref 6–8.5)
RBC # BLD AUTO: 4.77 X10E6/UL (ref 3.77–5.28)
SODIUM SERPL-SCNC: 144 MMOL/L (ref 134–144)
SPECIMEN STATUS REPORT: NORMAL
WBC # BLD AUTO: 6.4 X10E3/UL (ref 3.4–10.8)

## 2025-02-07 ENCOUNTER — TELEMEDICINE (OUTPATIENT)
Age: 55
End: 2025-02-07
Payer: COMMERCIAL

## 2025-02-07 DIAGNOSIS — Z79.899 LONG-TERM CURRENT USE OF HIGH RISK MEDICATION OTHER THAN ANTICOAGULANT: ICD-10-CM

## 2025-02-07 DIAGNOSIS — G43.009 MIGRAINE WITHOUT AURA AND WITHOUT STATUS MIGRAINOSUS, NOT INTRACTABLE: ICD-10-CM

## 2025-02-07 DIAGNOSIS — G35 MULTIPLE SCLEROSIS (HCC): Primary | ICD-10-CM

## 2025-02-07 DIAGNOSIS — F51.04 PSYCHOPHYSIOLOGICAL INSOMNIA: ICD-10-CM

## 2025-02-07 LAB
NEUROFILAMENT LIGHT CHAIN: 1.63 PG/ML (ref 0–3.78)
NFL, SERUM Z SCORE: -0.25 PG/ML

## 2025-02-07 PROCEDURE — 99214 OFFICE O/P EST MOD 30 MIN: CPT | Performed by: PSYCHIATRY & NEUROLOGY

## 2025-02-07 RX ORDER — ASCORBIC ACID 500 MG
500 TABLET ORAL DAILY
COMMUNITY

## 2025-02-07 RX ORDER — ZINC GLUCONATE 50 MG
50 TABLET ORAL DAILY
COMMUNITY

## 2025-02-07 ASSESSMENT — PATIENT HEALTH QUESTIONNAIRE - PHQ9
SUM OF ALL RESPONSES TO PHQ QUESTIONS 1-9: 0
SUM OF ALL RESPONSES TO PHQ QUESTIONS 1-9: 0
2. FEELING DOWN, DEPRESSED OR HOPELESS: NOT AT ALL
SUM OF ALL RESPONSES TO PHQ QUESTIONS 1-9: 0
SUM OF ALL RESPONSES TO PHQ9 QUESTIONS 1 & 2: 0
1. LITTLE INTEREST OR PLEASURE IN DOING THINGS: NOT AT ALL
SUM OF ALL RESPONSES TO PHQ QUESTIONS 1-9: 0

## 2025-02-07 NOTE — ASSESSMENT & PLAN NOTE
Patient is stable and continues on Ocrevus.    She had a very prolonged and severe bout of pneumonia at the end of 2025.  She is fully recovered from that and has been discharged from pulmonology care.    Immunoglobulin levels were ordered which were all within normal limits suggesting that she was probably not significantly immunocompromised from the Ocrevus as a cause for the resistant pneumonia.  But always possible     Serum neurofilament light chain continues to be normal.  Will repeat this level in 6 months.  She recently had her last level done yesterday.    Patient is to continue with Ocrevus every 6 months scheduled for June 2025 as her next appointment.    She is to continue to maintain standard universal precautions  She is to receive vaccines when appropriate as guided by primary care  And she is to continue to work to down regulate stress levels to include exercise yoga and meditation as well as good hydration and nutrition

## 2025-02-07 NOTE — ASSESSMENT & PLAN NOTE
Patient remains on high risk medication due to chronic immunosuppression.  Most recent immunoglobulin levels completed at the end of 2024 did not show any significant immunoglobulin chronic depletion.  She did have about a resistant pneumonia which is now resolved less likely due to Ocrevus due to normal immunoglobulin levels but always possible  She just had blood work completed yesterday all within normal limits including Serum neurofilament light chain    At this time she does not need repeat MRI scans and will have blood work completed at her next visit

## 2025-02-07 NOTE — ASSESSMENT & PLAN NOTE
Patient continues on Ambien 5 mg as needed; needing infrequently; well-controlled at this time continue on the Ambien as needed

## 2025-02-07 NOTE — PROGRESS NOTES
[facility]  [facility]  [address]  [place] [address]  Phone: [phone] fax: [phone]      Laverne Tomlin, was evaluated through a synchronous (real-time) audio-video encounter. The patient (or guardian if applicable) is aware that this is a billable service, which includes applicable co-pays. This Virtual Visit was conducted with patient's (and/or legal guardian's) consent. Patient identification was verified, and a caregiver was present when appropriate.   The patient was located at Home: [address]  [facility] [address]  Provider was located at Facility (Appt Dept): [address]  [address]  [place] [address]  Confirm you are appropriately licensed, registered, or certified to deliver care in the state where the patient is located as indicated above. If you are not or unsure, please re-schedule the visit: Yes, I confirm.     Laverne Tomlin (:  1970) is a Established patient, presenting virtually for evaluation of the following:   Chief Complaint   Patient presents with    Follow-up     MS follow up on ms and states that she just had a recent visit in October she does not have anything to report.   Basically seeing how meds are working.      1. Multiple sclerosis (HCC)  Assessment & Plan:  Patient is stable and continues on Ocrevus.    She had a very prolonged and severe bout of pneumonia at the end of .  She is fully recovered from that and has been discharged from pulmonology care.    Immunoglobulin levels were ordered which were all within normal limits suggesting that she was probably not significantly immunocompromised from the Ocrevus as a cause for the resistant pneumonia.  But always possible     Serum neurofilament light chain continues to be normal.  Will repeat this level in 6 months.  She recently had her last level done yesterday.    Patient is to continue with Ocrevus every 6 months

## 2025-02-07 NOTE — ASSESSMENT & PLAN NOTE
Well-controlled on rescue medication only.  Very infrequent not requiring preventative therapy uses butalbital as needed she is to continue with the butalbital as needed

## 2025-02-07 NOTE — PATIENT INSTRUCTIONS
As a reminder:   Please come to your appointment 15 minutes before your office appointment.  This way, you can get checked in at the  and checked in by the nursing staff so you have the full allotment of time with your provider for your visit.  Please bring an up-to-date and accurate list of all your medications.  Or bring all your active prescription bottles with you at the time of your office visit and this includes over-the-counter medications so we can make sure that your medication list is up-to-date.  If you are scheduled for a virtual visit, please be aware that the  will need to check you in and usually the day before to verify insurance and collect co-pays as appropriate.  Please be prepared for the second call which will be from the nurse to go over your medications and any other vital information.  This will probably be done 30 minutes prior to your visit.  The reason why we do this early is that you can get the full benefit of your appointment time with your provider.  Finally you will be given the link for your virtual visit please click into your link 10 minutes prior to your appointment and please wait patiently for the provider to join you        As per discussion  Overall you continue to do well I would not recommend any changes continue on the Ocrevus your blood work results came in from yesterday are all within normal limits    Continue to follow-up with your PCP as appropriate vaccinations as appropriate and universal precautions    Continue to work to down regulate your stress levels and we talked about many avenues for doing this.    I am glad you and your family are doing well and I will see you back hopefully in the office in 6 months                  Office Policies    Phone calls/patient messages:  Please allow up to 72 hours  for someone in the office to contact you about your call or message. Be mindful your provider may be out of the office or your message may require

## 2025-02-14 LAB
JCPYV AB SERPL QL IA: NORMAL
JCV AB INTERPRETATION: NORMAL
JCV ANTIBODY BY INHIBITION: NEGATIVE
STRATIFY JCV INDEX VALUE: 0.25
STRATIFY JCV INTERPRETATION: NORMAL

## 2025-02-26 ENCOUNTER — CLINICAL DOCUMENTATION (OUTPATIENT)
Age: 55
End: 2025-02-26

## 2025-02-26 NOTE — PROGRESS NOTES
Received fax from [employer] stating that patient is due for prior authorization for their Ocrevus infusion. They are requesting office notes from past 6 months, recent labs, and whether or not patient has primary progressive or relapsing remitting MS.    Faxed office notes from 02/07/2025 and 10/24/2024  Faxed labs from 02/05/2025  And indicated on fax cover sheet that documentation states that patient has relapsing remitting MS.    Received fax confirmation and sent to  for scanning.